# Patient Record
Sex: MALE | Race: WHITE
[De-identification: names, ages, dates, MRNs, and addresses within clinical notes are randomized per-mention and may not be internally consistent; named-entity substitution may affect disease eponyms.]

---

## 2017-05-31 ENCOUNTER — HOSPITAL ENCOUNTER (OUTPATIENT)
Dept: HOSPITAL 84 - D.CT | Age: 52
Discharge: HOME | End: 2017-05-31
Attending: FAMILY MEDICINE
Payer: SELF-PAY

## 2017-05-31 DIAGNOSIS — M79.604: ICD-10-CM

## 2017-05-31 DIAGNOSIS — R60.0: Primary | ICD-10-CM

## 2018-05-17 ENCOUNTER — HOSPITAL ENCOUNTER (INPATIENT)
Dept: HOSPITAL 84 - D.M2 | Age: 53
LOS: 3 days | Discharge: HOME | DRG: 603 | End: 2018-05-20
Attending: FAMILY MEDICINE | Admitting: FAMILY MEDICINE
Payer: COMMERCIAL

## 2018-05-17 VITALS — SYSTOLIC BLOOD PRESSURE: 137 MMHG | DIASTOLIC BLOOD PRESSURE: 85 MMHG

## 2018-05-17 VITALS — DIASTOLIC BLOOD PRESSURE: 95 MMHG | SYSTOLIC BLOOD PRESSURE: 123 MMHG

## 2018-05-17 VITALS
HEIGHT: 71 IN | HEIGHT: 71 IN | BODY MASS INDEX: 20.53 KG/M2 | BODY MASS INDEX: 20.53 KG/M2 | WEIGHT: 146.68 LBS | WEIGHT: 146.68 LBS

## 2018-05-17 DIAGNOSIS — Z87.891: ICD-10-CM

## 2018-05-17 DIAGNOSIS — Z95.1: ICD-10-CM

## 2018-05-17 DIAGNOSIS — L03.115: Primary | ICD-10-CM

## 2018-05-17 DIAGNOSIS — B35.3: ICD-10-CM

## 2018-05-17 DIAGNOSIS — I25.10: ICD-10-CM

## 2018-05-17 LAB
ALBUMIN SERPL-MCNC: 3.4 G/DL (ref 3.4–5)
ALP SERPL-CCNC: 107 U/L (ref 46–116)
ALT SERPL-CCNC: 63 U/L (ref 10–68)
ANION GAP SERPL CALC-SCNC: 13.5 MMOL/L (ref 8–16)
BASOPHILS NFR BLD AUTO: 0.4 % (ref 0–2)
BILIRUB SERPL-MCNC: 0.53 MG/DL (ref 0.2–1.3)
BUN SERPL-MCNC: 19 MG/DL (ref 7–18)
CALCIUM SERPL-MCNC: 8.4 MG/DL (ref 8.5–10.1)
CHLORIDE SERPL-SCNC: 106 MMOL/L (ref 98–107)
CO2 SERPL-SCNC: 25.2 MMOL/L (ref 21–32)
CREAT SERPL-MCNC: 1.9 MG/DL (ref 0.6–1.3)
EOSINOPHIL NFR BLD: 4.9 % (ref 0–7)
ERYTHROCYTE [DISTWIDTH] IN BLOOD BY AUTOMATED COUNT: 13.1 % (ref 11.5–14.5)
GLOBULIN SER-MCNC: 3.7 G/L
GLUCOSE SERPL-MCNC: 92 MG/DL (ref 74–106)
HCT VFR BLD CALC: 40.3 % (ref 42–54)
HGB BLD-MCNC: 14.3 G/DL (ref 13.5–17.5)
IMM GRANULOCYTES NFR BLD: 0.2 % (ref 0–5)
LYMPHOCYTES NFR BLD AUTO: 22.7 % (ref 15–50)
MCH RBC QN AUTO: 29.7 PG (ref 26–34)
MCHC RBC AUTO-ENTMCNC: 35.5 G/DL (ref 31–37)
MCV RBC: 83.6 FL (ref 80–100)
MONOCYTES NFR BLD: 14.9 % (ref 2–11)
NEUTROPHILS NFR BLD AUTO: 56.9 % (ref 40–80)
OSMOLALITY SERPL CALC.SUM OF ELEC: 282 MOSM/KG (ref 275–300)
PLATELET # BLD: 143 10X3/UL (ref 130–400)
PMV BLD AUTO: 9.4 FL (ref 7.4–10.4)
POTASSIUM SERPL-SCNC: 3.7 MMOL/L (ref 3.5–5.1)
PROT SERPL-MCNC: 7.1 G/DL (ref 6.4–8.2)
RBC # BLD AUTO: 4.82 10X6/UL (ref 4.2–6.1)
SODIUM SERPL-SCNC: 141 MMOL/L (ref 136–145)
WBC # BLD AUTO: 5.3 10X3/UL (ref 4.8–10.8)

## 2018-05-18 VITALS — SYSTOLIC BLOOD PRESSURE: 145 MMHG | DIASTOLIC BLOOD PRESSURE: 90 MMHG

## 2018-05-18 VITALS — DIASTOLIC BLOOD PRESSURE: 91 MMHG | SYSTOLIC BLOOD PRESSURE: 136 MMHG

## 2018-05-18 VITALS — DIASTOLIC BLOOD PRESSURE: 97 MMHG | SYSTOLIC BLOOD PRESSURE: 149 MMHG

## 2018-05-18 VITALS — DIASTOLIC BLOOD PRESSURE: 97 MMHG | SYSTOLIC BLOOD PRESSURE: 148 MMHG

## 2018-05-18 VITALS — SYSTOLIC BLOOD PRESSURE: 161 MMHG | DIASTOLIC BLOOD PRESSURE: 101 MMHG

## 2018-05-19 VITALS — DIASTOLIC BLOOD PRESSURE: 88 MMHG | SYSTOLIC BLOOD PRESSURE: 138 MMHG

## 2018-05-19 VITALS — SYSTOLIC BLOOD PRESSURE: 142 MMHG | DIASTOLIC BLOOD PRESSURE: 95 MMHG

## 2018-05-19 VITALS — SYSTOLIC BLOOD PRESSURE: 146 MMHG | DIASTOLIC BLOOD PRESSURE: 96 MMHG

## 2018-05-19 VITALS — DIASTOLIC BLOOD PRESSURE: 94 MMHG | SYSTOLIC BLOOD PRESSURE: 155 MMHG

## 2018-05-19 VITALS — DIASTOLIC BLOOD PRESSURE: 100 MMHG | SYSTOLIC BLOOD PRESSURE: 148 MMHG

## 2018-05-19 VITALS — SYSTOLIC BLOOD PRESSURE: 178 MMHG | DIASTOLIC BLOOD PRESSURE: 112 MMHG

## 2018-05-19 LAB
ANION GAP SERPL CALC-SCNC: 13.1 MMOL/L (ref 8–16)
BASOPHILS NFR BLD AUTO: 0.4 % (ref 0–2)
BUN SERPL-MCNC: 21 MG/DL (ref 7–18)
CALCIUM SERPL-MCNC: 8.3 MG/DL (ref 8.5–10.1)
CHLORIDE SERPL-SCNC: 107 MMOL/L (ref 98–107)
CO2 SERPL-SCNC: 25.7 MMOL/L (ref 21–32)
CREAT SERPL-MCNC: 1.9 MG/DL (ref 0.6–1.3)
EOSINOPHIL NFR BLD: 6.2 % (ref 0–7)
ERYTHROCYTE [DISTWIDTH] IN BLOOD BY AUTOMATED COUNT: 13 % (ref 11.5–14.5)
GLUCOSE SERPL-MCNC: 94 MG/DL (ref 74–106)
HCT VFR BLD CALC: 41 % (ref 42–54)
HGB BLD-MCNC: 14.3 G/DL (ref 13.5–17.5)
IMM GRANULOCYTES NFR BLD: 0 % (ref 0–5)
LYMPHOCYTES NFR BLD AUTO: 21.5 % (ref 15–50)
MCH RBC QN AUTO: 29.4 PG (ref 26–34)
MCHC RBC AUTO-ENTMCNC: 34.9 G/DL (ref 31–37)
MCV RBC: 84.2 FL (ref 80–100)
MONOCYTES NFR BLD: 10.4 % (ref 2–11)
NEUTROPHILS NFR BLD AUTO: 61.5 % (ref 40–80)
OSMOLALITY SERPL CALC.SUM OF ELEC: 285 MOSM/KG (ref 275–300)
PLATELET # BLD: 155 10X3/UL (ref 130–400)
PMV BLD AUTO: 9.8 FL (ref 7.4–10.4)
POTASSIUM SERPL-SCNC: 3.8 MMOL/L (ref 3.5–5.1)
RBC # BLD AUTO: 4.87 10X6/UL (ref 4.2–6.1)
SODIUM SERPL-SCNC: 142 MMOL/L (ref 136–145)
WBC # BLD AUTO: 4.5 10X3/UL (ref 4.8–10.8)

## 2018-05-20 VITALS — SYSTOLIC BLOOD PRESSURE: 144 MMHG | DIASTOLIC BLOOD PRESSURE: 93 MMHG

## 2018-05-20 VITALS — DIASTOLIC BLOOD PRESSURE: 93 MMHG | SYSTOLIC BLOOD PRESSURE: 150 MMHG

## 2018-05-20 VITALS — DIASTOLIC BLOOD PRESSURE: 99 MMHG | SYSTOLIC BLOOD PRESSURE: 154 MMHG

## 2018-05-20 VITALS — SYSTOLIC BLOOD PRESSURE: 161 MMHG | DIASTOLIC BLOOD PRESSURE: 99 MMHG

## 2018-05-20 LAB
ALBUMIN SERPL-MCNC: 3.6 G/DL (ref 3.4–5)
ALP SERPL-CCNC: 125 U/L (ref 46–116)
ALT SERPL-CCNC: 43 U/L (ref 10–68)
ANION GAP SERPL CALC-SCNC: 13.9 MMOL/L (ref 8–16)
BASOPHILS NFR BLD AUTO: 0.4 % (ref 0–2)
BILIRUB SERPL-MCNC: 0.42 MG/DL (ref 0.2–1.3)
BUN SERPL-MCNC: 21 MG/DL (ref 7–18)
CALCIUM SERPL-MCNC: 8.8 MG/DL (ref 8.5–10.1)
CHLORIDE SERPL-SCNC: 105 MMOL/L (ref 98–107)
CO2 SERPL-SCNC: 25.1 MMOL/L (ref 21–32)
CREAT SERPL-MCNC: 1.9 MG/DL (ref 0.6–1.3)
EOSINOPHIL NFR BLD: 5.6 % (ref 0–7)
ERYTHROCYTE [DISTWIDTH] IN BLOOD BY AUTOMATED COUNT: 13 % (ref 11.5–14.5)
GLOBULIN SER-MCNC: 3.7 G/L
GLUCOSE SERPL-MCNC: 102 MG/DL (ref 74–106)
HCT VFR BLD CALC: 43.5 % (ref 42–54)
HGB BLD-MCNC: 15.4 G/DL (ref 13.5–17.5)
IMM GRANULOCYTES NFR BLD: 0.4 % (ref 0–5)
LYMPHOCYTES NFR BLD AUTO: 25.5 % (ref 15–50)
MCH RBC QN AUTO: 29.7 PG (ref 26–34)
MCHC RBC AUTO-ENTMCNC: 35.4 G/DL (ref 31–37)
MCV RBC: 84 FL (ref 80–100)
MONOCYTES NFR BLD: 6.5 % (ref 2–11)
NEUTROPHILS NFR BLD AUTO: 61.6 % (ref 40–80)
OSMOLALITY SERPL CALC.SUM OF ELEC: 281 MOSM/KG (ref 275–300)
PLATELET # BLD: 183 10X3/UL (ref 130–400)
PMV BLD AUTO: 9.6 FL (ref 7.4–10.4)
POTASSIUM SERPL-SCNC: 4 MMOL/L (ref 3.5–5.1)
PROT SERPL-MCNC: 7.3 G/DL (ref 6.4–8.2)
RBC # BLD AUTO: 5.18 10X6/UL (ref 4.2–6.1)
SODIUM SERPL-SCNC: 140 MMOL/L (ref 136–145)
WBC # BLD AUTO: 4.6 10X3/UL (ref 4.8–10.8)

## 2018-06-26 ENCOUNTER — HOSPITAL ENCOUNTER (OUTPATIENT)
Dept: HOSPITAL 84 - OBSVTIME | Age: 53
LOS: 1 days | Discharge: HOME | End: 2018-06-27
Attending: INTERNAL MEDICINE
Payer: COMMERCIAL

## 2018-06-26 VITALS — DIASTOLIC BLOOD PRESSURE: 85 MMHG | SYSTOLIC BLOOD PRESSURE: 143 MMHG

## 2018-06-26 VITALS — SYSTOLIC BLOOD PRESSURE: 160 MMHG | DIASTOLIC BLOOD PRESSURE: 98 MMHG

## 2018-06-26 VITALS — SYSTOLIC BLOOD PRESSURE: 150 MMHG | DIASTOLIC BLOOD PRESSURE: 94 MMHG

## 2018-06-26 VITALS — SYSTOLIC BLOOD PRESSURE: 148 MMHG | DIASTOLIC BLOOD PRESSURE: 89 MMHG

## 2018-06-26 VITALS — DIASTOLIC BLOOD PRESSURE: 95 MMHG | SYSTOLIC BLOOD PRESSURE: 151 MMHG

## 2018-06-26 VITALS — HEIGHT: 71 IN | WEIGHT: 259.54 LBS | BODY MASS INDEX: 36.34 KG/M2

## 2018-06-26 DIAGNOSIS — I25.110: Primary | ICD-10-CM

## 2018-06-26 DIAGNOSIS — I10: ICD-10-CM

## 2018-06-26 DIAGNOSIS — I25.710: ICD-10-CM

## 2018-06-26 DIAGNOSIS — Z01.812: ICD-10-CM

## 2018-06-26 DIAGNOSIS — I25.2: ICD-10-CM

## 2018-06-26 DIAGNOSIS — E78.5: ICD-10-CM

## 2018-06-26 LAB
ALBUMIN SERPL-MCNC: 3.9 G/DL (ref 3.4–5)
ALP SERPL-CCNC: 151 U/L (ref 46–116)
ALT SERPL-CCNC: 47 U/L (ref 10–68)
ANION GAP SERPL CALC-SCNC: 11.7 MMOL/L (ref 8–16)
APTT BLD: 25.4 SECONDS (ref 22.8–39.4)
BASOPHILS NFR BLD AUTO: 0.4 % (ref 0–2)
BILIRUB SERPL-MCNC: 0.59 MG/DL (ref 0.2–1.3)
BUN SERPL-MCNC: 17 MG/DL (ref 7–18)
CALCIUM SERPL-MCNC: 8.4 MG/DL (ref 8.5–10.1)
CHLORIDE SERPL-SCNC: 106 MMOL/L (ref 98–107)
CK MB SERPL-MCNC: 2.7 U/L (ref 0–3.6)
CK SERPL-CCNC: 225 UL (ref 21–232)
CO2 SERPL-SCNC: 25.7 MMOL/L (ref 21–32)
CREAT SERPL-MCNC: 2 MG/DL (ref 0.6–1.3)
D DIMER PPP FEU-MCNC: < 0.27 UG/MLFEU (ref 0.2–0.54)
EOSINOPHIL NFR BLD: 6.2 % (ref 0–7)
ERYTHROCYTE [DISTWIDTH] IN BLOOD BY AUTOMATED COUNT: 13.4 % (ref 11.5–14.5)
GLOBULIN SER-MCNC: 3.1 G/L
GLUCOSE SERPL-MCNC: 87 MG/DL (ref 74–106)
HCT VFR BLD CALC: 40.5 % (ref 42–54)
HGB BLD-MCNC: 14.2 G/DL (ref 13.5–17.5)
IMM GRANULOCYTES NFR BLD: 0.2 % (ref 0–5)
INR PPP: 1.15 (ref 0.85–1.17)
LYMPHOCYTES NFR BLD AUTO: 25.6 % (ref 15–50)
MAGNESIUM SERPL-MCNC: 2.2 MG/DL (ref 1.8–2.4)
MCH RBC QN AUTO: 29.6 PG (ref 26–34)
MCHC RBC AUTO-ENTMCNC: 35.1 G/DL (ref 31–37)
MCV RBC: 84.4 FL (ref 80–100)
MONOCYTES NFR BLD: 9.4 % (ref 2–11)
NEUTROPHILS NFR BLD AUTO: 58.2 % (ref 40–80)
OSMOLALITY SERPL CALC.SUM OF ELEC: 279 MOSM/KG (ref 275–300)
PLATELET # BLD: 133 10X3/UL (ref 130–400)
PMV BLD AUTO: 9.8 FL (ref 7.4–10.4)
POTASSIUM SERPL-SCNC: 3.4 MMOL/L (ref 3.5–5.1)
PROT SERPL-MCNC: 7 G/DL (ref 6.4–8.2)
PROTHROMBIN TIME: 14.2 SECONDS (ref 11.6–15)
RBC # BLD AUTO: 4.8 10X6/UL (ref 4.2–6.1)
SODIUM SERPL-SCNC: 140 MMOL/L (ref 136–145)
TROPONIN I SERPL-MCNC: 0.05 NG/ML (ref 0–0.06)
WBC # BLD AUTO: 4.8 10X3/UL (ref 4.8–10.8)

## 2018-06-27 VITALS — DIASTOLIC BLOOD PRESSURE: 80 MMHG | SYSTOLIC BLOOD PRESSURE: 136 MMHG

## 2018-06-27 VITALS — DIASTOLIC BLOOD PRESSURE: 99 MMHG | SYSTOLIC BLOOD PRESSURE: 138 MMHG

## 2018-06-27 VITALS — DIASTOLIC BLOOD PRESSURE: 98 MMHG | SYSTOLIC BLOOD PRESSURE: 153 MMHG

## 2018-06-27 VITALS — DIASTOLIC BLOOD PRESSURE: 101 MMHG | SYSTOLIC BLOOD PRESSURE: 158 MMHG

## 2018-06-27 VITALS — DIASTOLIC BLOOD PRESSURE: 89 MMHG | SYSTOLIC BLOOD PRESSURE: 144 MMHG

## 2018-06-27 VITALS — DIASTOLIC BLOOD PRESSURE: 96 MMHG | SYSTOLIC BLOOD PRESSURE: 161 MMHG

## 2018-07-01 ENCOUNTER — HOSPITAL ENCOUNTER (OUTPATIENT)
Dept: HOSPITAL 84 - OBSVTIME | Age: 53
Discharge: HOME | End: 2018-07-01
Attending: INTERNAL MEDICINE
Payer: COMMERCIAL

## 2018-07-01 VITALS — SYSTOLIC BLOOD PRESSURE: 120 MMHG | DIASTOLIC BLOOD PRESSURE: 86 MMHG

## 2018-07-01 VITALS — SYSTOLIC BLOOD PRESSURE: 128 MMHG | DIASTOLIC BLOOD PRESSURE: 71 MMHG

## 2018-07-01 VITALS — DIASTOLIC BLOOD PRESSURE: 100 MMHG | SYSTOLIC BLOOD PRESSURE: 144 MMHG

## 2018-07-01 VITALS — WEIGHT: 246.92 LBS | BODY MASS INDEX: 34.57 KG/M2 | BODY MASS INDEX: 34.57 KG/M2 | HEIGHT: 71 IN

## 2018-07-01 VITALS — DIASTOLIC BLOOD PRESSURE: 109 MMHG | SYSTOLIC BLOOD PRESSURE: 163 MMHG

## 2018-07-01 VITALS — SYSTOLIC BLOOD PRESSURE: 142 MMHG | DIASTOLIC BLOOD PRESSURE: 88 MMHG

## 2018-07-01 VITALS — SYSTOLIC BLOOD PRESSURE: 131 MMHG | DIASTOLIC BLOOD PRESSURE: 87 MMHG

## 2018-07-01 DIAGNOSIS — Z95.5: ICD-10-CM

## 2018-07-01 DIAGNOSIS — Z79.02: ICD-10-CM

## 2018-07-01 DIAGNOSIS — Z79.82: ICD-10-CM

## 2018-07-01 DIAGNOSIS — Z01.812: ICD-10-CM

## 2018-07-01 DIAGNOSIS — I25.110: Primary | ICD-10-CM

## 2018-07-01 DIAGNOSIS — I10: ICD-10-CM

## 2018-07-01 DIAGNOSIS — Z95.1: ICD-10-CM

## 2018-07-01 DIAGNOSIS — E78.5: ICD-10-CM

## 2018-07-01 LAB
ALBUMIN SERPL-MCNC: 3.9 G/DL (ref 3.4–5)
ALP SERPL-CCNC: 135 U/L (ref 46–116)
ALT SERPL-CCNC: 35 U/L (ref 10–68)
ANION GAP SERPL CALC-SCNC: 11.4 MMOL/L (ref 8–16)
APTT BLD: 26.2 SECONDS (ref 22.8–39.4)
BASOPHILS NFR BLD AUTO: 0.6 % (ref 0–2)
BILIRUB SERPL-MCNC: 0.9 MG/DL (ref 0.2–1.3)
BUN SERPL-MCNC: 14 MG/DL (ref 7–18)
CALCIUM SERPL-MCNC: 9.2 MG/DL (ref 8.5–10.1)
CHLORIDE SERPL-SCNC: 105 MMOL/L (ref 98–107)
CK MB SERPL-MCNC: 1.3 U/L (ref 0–3.6)
CK SERPL-CCNC: 109 UL (ref 21–232)
CO2 SERPL-SCNC: 27.4 MMOL/L (ref 21–32)
CREAT SERPL-MCNC: 1.9 MG/DL (ref 0.6–1.3)
EOSINOPHIL NFR BLD: 6.6 % (ref 0–7)
ERYTHROCYTE [DISTWIDTH] IN BLOOD BY AUTOMATED COUNT: 13.2 % (ref 11.5–14.5)
GLOBULIN SER-MCNC: 3.4 G/L
GLUCOSE SERPL-MCNC: 98 MG/DL (ref 74–106)
HCT VFR BLD CALC: 43.1 % (ref 42–54)
HGB BLD-MCNC: 15.1 G/DL (ref 13.5–17.5)
IMM GRANULOCYTES NFR BLD: 0.2 % (ref 0–5)
INR PPP: 1.17 (ref 0.85–1.17)
LYMPHOCYTES NFR BLD AUTO: 30.1 % (ref 15–50)
MCH RBC QN AUTO: 29.3 PG (ref 26–34)
MCHC RBC AUTO-ENTMCNC: 35 G/DL (ref 31–37)
MCV RBC: 83.7 FL (ref 80–100)
MONOCYTES NFR BLD: 9.6 % (ref 2–11)
NEUTROPHILS NFR BLD AUTO: 52.9 % (ref 40–80)
NT-PROBNP SERPL-MCNC: 213 PG/ML (ref 0–125)
OSMOLALITY SERPL CALC.SUM OF ELEC: 279 MOSM/KG (ref 275–300)
PLATELET # BLD: 144 10X3/UL (ref 130–400)
PMV BLD AUTO: 9.7 FL (ref 7.4–10.4)
POTASSIUM SERPL-SCNC: 3.8 MMOL/L (ref 3.5–5.1)
PROT SERPL-MCNC: 7.3 G/DL (ref 6.4–8.2)
PROTHROMBIN TIME: 14.5 SECONDS (ref 11.6–15)
RBC # BLD AUTO: 5.15 10X6/UL (ref 4.2–6.1)
SODIUM SERPL-SCNC: 140 MMOL/L (ref 136–145)
TROPONIN I SERPL-MCNC: 0.03 NG/ML (ref 0–0.06)
WBC # BLD AUTO: 5.1 10X3/UL (ref 4.8–10.8)

## 2018-12-15 ENCOUNTER — HOSPITAL ENCOUNTER (OUTPATIENT)
Dept: HOSPITAL 84 - D.ER | Age: 53
Setting detail: OBSERVATION
Discharge: HOME | End: 2018-12-15
Attending: INTERNAL MEDICINE | Admitting: INTERNAL MEDICINE
Payer: MEDICAID

## 2018-12-15 VITALS — SYSTOLIC BLOOD PRESSURE: 110 MMHG | DIASTOLIC BLOOD PRESSURE: 92 MMHG

## 2018-12-15 VITALS — SYSTOLIC BLOOD PRESSURE: 120 MMHG | DIASTOLIC BLOOD PRESSURE: 79 MMHG

## 2018-12-15 VITALS
BODY MASS INDEX: 35 KG/M2 | BODY MASS INDEX: 35 KG/M2 | HEIGHT: 71 IN | BODY MASS INDEX: 35 KG/M2 | WEIGHT: 250 LBS | HEIGHT: 71 IN | WEIGHT: 250 LBS

## 2018-12-15 VITALS — SYSTOLIC BLOOD PRESSURE: 115 MMHG | DIASTOLIC BLOOD PRESSURE: 82 MMHG

## 2018-12-15 VITALS — DIASTOLIC BLOOD PRESSURE: 82 MMHG | SYSTOLIC BLOOD PRESSURE: 115 MMHG

## 2018-12-15 VITALS — SYSTOLIC BLOOD PRESSURE: 160 MMHG | DIASTOLIC BLOOD PRESSURE: 78 MMHG

## 2018-12-15 DIAGNOSIS — Z95.1: ICD-10-CM

## 2018-12-15 DIAGNOSIS — K21.9: ICD-10-CM

## 2018-12-15 DIAGNOSIS — I10: ICD-10-CM

## 2018-12-15 DIAGNOSIS — E03.9: ICD-10-CM

## 2018-12-15 DIAGNOSIS — E78.5: ICD-10-CM

## 2018-12-15 DIAGNOSIS — I25.110: Primary | ICD-10-CM

## 2018-12-15 LAB
ALBUMIN SERPL-MCNC: 4 G/DL (ref 3.4–5)
ALP SERPL-CCNC: 154 U/L (ref 46–116)
ALT SERPL-CCNC: 155 U/L (ref 10–68)
ANION GAP SERPL CALC-SCNC: 15.8 MMOL/L (ref 8–16)
BASOPHILS NFR BLD AUTO: 0.3 % (ref 0–2)
BILIRUB SERPL-MCNC: 0.36 MG/DL (ref 0.2–1.3)
BUN SERPL-MCNC: 20 MG/DL (ref 7–18)
CALCIUM SERPL-MCNC: 8.5 MG/DL (ref 8.5–10.1)
CHLORIDE SERPL-SCNC: 104 MMOL/L (ref 98–107)
CK MB SERPL-MCNC: 4.3 U/L (ref 0–3.6)
CK SERPL-CCNC: 170 UL (ref 21–232)
CO2 SERPL-SCNC: 25 MMOL/L (ref 21–32)
CREAT SERPL-MCNC: 1.6 MG/DL (ref 0.6–1.3)
EOSINOPHIL NFR BLD: 7.6 % (ref 0–7)
ERYTHROCYTE [DISTWIDTH] IN BLOOD BY AUTOMATED COUNT: 13.1 % (ref 11.5–14.5)
GLOBULIN SER-MCNC: 3.4 G/L
GLUCOSE SERPL-MCNC: 98 MG/DL (ref 74–106)
HCT VFR BLD CALC: 43.1 % (ref 42–54)
HGB BLD-MCNC: 15.2 G/DL (ref 13.5–17.5)
IMM GRANULOCYTES NFR BLD: 0.2 % (ref 0–5)
INR PPP: 1.07 (ref 0.85–1.17)
LYMPHOCYTES NFR BLD AUTO: 31.4 % (ref 15–50)
MAGNESIUM SERPL-MCNC: 2.1 MG/DL (ref 1.8–2.4)
MCH RBC QN AUTO: 30.8 PG (ref 26–34)
MCHC RBC AUTO-ENTMCNC: 35.3 G/DL (ref 31–37)
MCV RBC: 87.2 FL (ref 80–100)
MONOCYTES NFR BLD: 10.5 % (ref 2–11)
NEUTROPHILS NFR BLD AUTO: 50 % (ref 40–80)
OSMOLALITY SERPL CALC.SUM OF ELEC: 283 MOSM/KG (ref 275–300)
PLATELET # BLD: 121 10X3/UL (ref 130–400)
PMV BLD AUTO: 10 FL (ref 7.4–10.4)
POTASSIUM SERPL-SCNC: 3.8 MMOL/L (ref 3.5–5.1)
PROT SERPL-MCNC: 7.4 G/DL (ref 6.4–8.2)
PROTHROMBIN TIME: 13.4 SECONDS (ref 11.6–15)
RBC # BLD AUTO: 4.94 10X6/UL (ref 4.2–6.1)
SODIUM SERPL-SCNC: 141 MMOL/L (ref 136–145)
TROPONIN I SERPL-MCNC: 0.82 NG/ML (ref 0–0.06)
WBC # BLD AUTO: 6.6 10X3/UL (ref 4.8–10.8)

## 2018-12-29 ENCOUNTER — HOSPITAL ENCOUNTER (OUTPATIENT)
Dept: HOSPITAL 84 - D.ER | Age: 53
Setting detail: OBSERVATION
Discharge: HOME | End: 2018-12-29
Attending: FAMILY MEDICINE | Admitting: FAMILY MEDICINE
Payer: MEDICAID

## 2018-12-29 VITALS — SYSTOLIC BLOOD PRESSURE: 160 MMHG | DIASTOLIC BLOOD PRESSURE: 92 MMHG

## 2018-12-29 VITALS — SYSTOLIC BLOOD PRESSURE: 128 MMHG | DIASTOLIC BLOOD PRESSURE: 83 MMHG

## 2018-12-29 VITALS — DIASTOLIC BLOOD PRESSURE: 84 MMHG | SYSTOLIC BLOOD PRESSURE: 141 MMHG

## 2018-12-29 VITALS — WEIGHT: 265.56 LBS | BODY MASS INDEX: 37.18 KG/M2 | BODY MASS INDEX: 37.18 KG/M2 | HEIGHT: 71 IN

## 2018-12-29 VITALS — DIASTOLIC BLOOD PRESSURE: 88 MMHG | SYSTOLIC BLOOD PRESSURE: 135 MMHG

## 2018-12-29 VITALS — DIASTOLIC BLOOD PRESSURE: 83 MMHG | SYSTOLIC BLOOD PRESSURE: 140 MMHG

## 2018-12-29 VITALS — SYSTOLIC BLOOD PRESSURE: 139 MMHG | DIASTOLIC BLOOD PRESSURE: 85 MMHG

## 2018-12-29 VITALS — DIASTOLIC BLOOD PRESSURE: 81 MMHG | SYSTOLIC BLOOD PRESSURE: 126 MMHG

## 2018-12-29 DIAGNOSIS — E03.9: ICD-10-CM

## 2018-12-29 DIAGNOSIS — I10: ICD-10-CM

## 2018-12-29 DIAGNOSIS — Z95.1: ICD-10-CM

## 2018-12-29 DIAGNOSIS — I25.118: Primary | ICD-10-CM

## 2018-12-29 DIAGNOSIS — E78.5: ICD-10-CM

## 2018-12-29 LAB
ALBUMIN SERPL-MCNC: 3.7 G/DL (ref 3.4–5)
ALP SERPL-CCNC: 151 U/L (ref 46–116)
ALT SERPL-CCNC: 46 U/L (ref 10–68)
ANION GAP SERPL CALC-SCNC: 15.3 MMOL/L (ref 8–16)
APTT BLD: 25.1 SECONDS (ref 22.8–39.4)
BILIRUB SERPL-MCNC: 0.29 MG/DL (ref 0.2–1.3)
BUN SERPL-MCNC: 20 MG/DL (ref 7–18)
CALCIUM SERPL-MCNC: 8.5 MG/DL (ref 8.5–10.1)
CHLORIDE SERPL-SCNC: 108 MMOL/L (ref 98–107)
CK MB SERPL-MCNC: 1.7 U/L (ref 0–3.6)
CK MB SERPL-MCNC: 2.1 U/L (ref 0–3.6)
CK MB SERPL-MCNC: 2.7 U/L (ref 0–3.6)
CK SERPL-CCNC: 113 UL (ref 21–232)
CK SERPL-CCNC: 86 UL (ref 21–232)
CK SERPL-CCNC: 91 UL (ref 21–232)
CO2 SERPL-SCNC: 24.6 MMOL/L (ref 21–32)
CREAT SERPL-MCNC: 1.8 MG/DL (ref 0.6–1.3)
ERYTHROCYTE [DISTWIDTH] IN BLOOD BY AUTOMATED COUNT: 13.2 % (ref 11.5–14.5)
GLOBULIN SER-MCNC: 3.3 G/L
GLUCOSE SERPL-MCNC: 113 MG/DL (ref 74–106)
HCT VFR BLD CALC: 41.1 % (ref 42–54)
HGB BLD-MCNC: 14.6 G/DL (ref 13.5–17.5)
INR PPP: 1.17 (ref 0.85–1.17)
LYMPHOCYTES NFR BLD AUTO: 33.1 % (ref 15–50)
MAGNESIUM SERPL-MCNC: 1.9 MG/DL (ref 1.8–2.4)
MCH RBC QN AUTO: 30.8 PG (ref 26–34)
MCHC RBC AUTO-ENTMCNC: 35.5 G/DL (ref 31–37)
MCV RBC: 86.7 FL (ref 80–100)
NEUTROPHILS NFR BLD AUTO: 51.9 % (ref 40–80)
OSMOLALITY SERPL CALC.SUM OF ELEC: 290 MOSM/KG (ref 275–300)
PLATELET # BLD: 125 10X3/UL (ref 130–400)
PMV BLD AUTO: 9.1 FL (ref 7.4–10.4)
POTASSIUM SERPL-SCNC: 3.9 MMOL/L (ref 3.5–5.1)
PROT SERPL-MCNC: 7 G/DL (ref 6.4–8.2)
PROTHROMBIN TIME: 14.3 SECONDS (ref 11.6–15)
RBC # BLD AUTO: 4.74 10X6/UL (ref 4.2–6.1)
SODIUM SERPL-SCNC: 144 MMOL/L (ref 136–145)
TROPONIN I SERPL-MCNC: 0.09 NG/ML (ref 0–0.06)
TROPONIN I SERPL-MCNC: 0.1 NG/ML (ref 0–0.06)
TROPONIN I SERPL-MCNC: 0.1 NG/ML (ref 0–0.06)
WBC # BLD AUTO: 5.2 10X3/UL (ref 4.8–10.8)

## 2018-12-31 NOTE — MORECARE
CASE MANAGEMENT DISCHARGE SUMMARY
 
 
PATIENT: SUSAN LÓPEZ                    UNIT: [id number]
ACCOUNT#: [id number]                       ADM DATE: 18
AGE: 53     : 65  SEX: M            ROOM/BED: D.2101    
AUTHOR: BRENDA FRASER                             PHYSICIAN:                               
 
REFERRING PHYSICIAN: ISABEL OLIVIA MD           
DATE OF SERVICE: 18
Discharge Plan
 
 
Patient Name: SUSAN LÓPEZ
Facility: [facility]:[place]
Encounter #: [id number]
Medical Record #: [id number]
: 1965
Planned Disposition: Home
Anticipated Discharge Date: 18
 
Discharge Date: 2018
Expected LOS: 1
Initial Reviewer: [id number]
Initial Review Date: 2018
Generated: 18  10:03 am 
  
 
 
 
 
 
 
 
Patient Name: SUSAN LÓPEZ
 
Encounter #: [id number]
Page [id number]
 
 
 
 
 
Electronically Signed by BRENDA FRASER on 18 at 0903
 
 
 
 
 
 
**All edits/amendments must be made on the electronic document**
 
DICTATION DATE: 18     : CONRADO  18     
RPT#: [id number]                                DC DATE:18
                                               STATUS: DIS IN  
[facility]
[address] [place] [address]
***END OF REPORT***

## 2018-12-31 NOTE — CN
PATIENT NAME:SUSAN LÓPEZ                              MEDICAL RECORD: [id number]
: 65                                              LOCATION:D. D.2101
ADMIT DATE: 18                                       ACCOUNT: [id number]
CONSULTING PHYSICIAN:    SHYAM MARTINEZ MD             
                                               
REFERRING PHYSICIAN:     ISABEL OLIVIA MD           
 
 
DATE OF CONSULTATION:  2018
 
DIAGNOSES:
1.  Angina, chronic.
2.  Coronary artery disease.
3.  Status post coronary bypass graft surgery.
4.  Hypertension.
5.  Hyperlipidemia.
 
HISTORY:  Mr. López presents with continued angina.  Troponin is elevated.  He
was just seen by Dr. Zamorano on  with similar.  He had a cardiac
catheterization revealing patency of the vein graft to the circumflex, diagonal,
LIMA to the LAD, and closure of the vein graft to the RCA.  He had diffuse
distal disease and small vessel disease.  This is the etiology of his chronic
angina as well as the closure of the RCA territory.  This was only a medical
management situation.  There was no role for cardiac intervention.  He was
placed on Ranexa as well as on lisinopril, metoprolol, and Lipitor.  He has
continued to have anginal symptomatology in a debilitating way.  He is unable to
really do any significant exertion without having significant anginal
symptomatology.  In the past, he was on nitro patch.  He only took this p.r.n.
and it was only 0.2 mg patch.  His systolic blood pressure is in the 130s and
heart rate is in the 70s.
 
PHYSICAL EXAMINATION:
GENERAL APPEARANCE:  Well-nourished, well-developed, appears stated age.  Level
of distress, comfortable.
PSYCHIATRIC:  Mental status, alert, normal affect.  Orientation, oriented to
time, place and person.
EYES:  Lids and conjunctiva, noninjected.  No discharge, no pallor.
ENT:  Lips, teeth, gums, normal dentition.  Oropharynx, no cyanosis, no pallor.
NECK:  Carotid arteries, bilateral normal upstroke, no bruits, no thrills.
JUGULAR VEINS:  No jugular venous pressure or distention.
CERVICAL LYMPH NODES:  Nontender, nonenlarged.
THYROID:  Not enlarged.  Nontender.  No nodules.
LUNGS:  Respiratory effort, unlabored.
CHEST:  Normal curvature.  No thoracic deformity.  No chest wall tenderness. 
Percussion, resonant.  Auscultation, clear.  No wheezes, no rales, no rhonchi.
CARDIOVASCULAR:  Precordial exam, nondisplaced.  No heaves or pericardial
thrills.  Rate and rhythm, regular.  Heart sounds, normal S1, normal S2.  No S3,
no gallop, no rub.  Systolic murmur, not heard.  Diastolic murmur, not heard.
EXTREMITIES:  No cyanosis, no edema.  Peripheral pulses, full and equal in all
extremities, except as noted.  No bruits appreciated.
ABDOMEN:  Soft, nondistended.  Normal aorta.  No bruit.  Nontender.  No masses. 
Liver, nontender, no hepatomegaly.  Spleen, nontender, no splenomegaly.
MUSCULOSKELETAL:  No joint tenderness.  No joint swelling.  No erythema.
NEUROLOGICAL:  Normal gait, normal strength, normal tone.
SKIN:  Warm and dry.
 
OVERALL IMPRESSION:  Continued anginal symptomatology.  It is debilitating.  We
will add the nitro patch back at 0.6.  Continue the Ranexa.  We will see him
 
 
 
CONSULT REPORT                                 [id number]    SUSAN LÓPEZ            
 
 
this week on Thursday for an appointment to see if this has made a difference
and continue medical adjustment.  He is only on 500 mg of Ranexa.  We could go
up to 1000 mg twice a day on the Ranexa for the angina.  This will be determined
with what angina he has after the addition of the nitro patch.
 
TRANSINT:[id number] Voice Confirmation ID: [id number] DOCUMENT ID: [id number]
                                           
                                           SHYAM MARTINEZ MD             
 
 
 
Electronically Signed by SHYAM MARTINEZ on 18 at 1436
 
 
 
 
 
 
 
 
 
 
 
 
 
 
 
 
 
 
 
 
 
 
 
 
 
 
 
 
 
 
 
 
 
 
 
CC:                                                             [id number]
DICTATION DATE: 18 1048     :     18 1836      DIS IN  
                                                                      18
[facility]                                          
[address] [id number]

## 2018-12-31 NOTE — MORECARE
CASE MANAGEMENT DISCHARGE SUMMARY
 
 
PATIENT: SUSAN LÓPEZ                    UNIT: [id number]
ACCOUNT#: [id number]                       ADM DATE: 18
AGE: 53     : 65  SEX: M            ROOM/BED: D.2101    
AUTHOR: BRENDA FRASER                             PHYSICIAN:                               
 
REFERRING PHYSICIAN: ISABEL OLIVIA MD           
DATE OF SERVICE: 18
Discharge Plan
 
 
Patient Name: SUSAN LÓPEZ
Facility: [facility]:[place]
Encounter #: [id number]
Medical Record #: [id number]
: 1965
Planned Disposition: Home
Anticipated Discharge Date: 18
 
Discharge Date: 2018
Expected LOS: 1
Initial Reviewer: [email or web address]
Initial Review Date: 2018
Generated: 18   9:56 am 
  
 
 
 
 
 
 
 
Patient Name: SUSAN LÓPEZ
 
Encounter #: [id number]
Page [id number]
 
 
 
 
 
Electronically Signed by BRENDA FRASER on 18 at 0856
 
 
 
 
 
 
**All edits/amendments must be made on the electronic document**
 
DICTATION DATE: 1856     : CONRADO  18 0856     
RPT#: [id number]                                DC DATE:18
                                               STATUS: DIS IN  
[facility]
[address] [place] [address]
***END OF REPORT***

## 2019-03-10 ENCOUNTER — HOSPITAL ENCOUNTER (OUTPATIENT)
Dept: HOSPITAL 84 - D.ER | Age: 54
Setting detail: OBSERVATION
LOS: 1 days | Discharge: HOME | End: 2019-03-11
Attending: INTERNAL MEDICINE | Admitting: INTERNAL MEDICINE
Payer: MEDICAID

## 2019-03-10 VITALS — DIASTOLIC BLOOD PRESSURE: 86 MMHG | SYSTOLIC BLOOD PRESSURE: 134 MMHG

## 2019-03-10 VITALS
WEIGHT: 246.52 LBS | HEIGHT: 71 IN | BODY MASS INDEX: 34.51 KG/M2 | HEIGHT: 71 IN | BODY MASS INDEX: 34.51 KG/M2 | WEIGHT: 246.52 LBS

## 2019-03-10 VITALS — DIASTOLIC BLOOD PRESSURE: 88 MMHG | SYSTOLIC BLOOD PRESSURE: 145 MMHG

## 2019-03-10 VITALS — DIASTOLIC BLOOD PRESSURE: 93 MMHG | SYSTOLIC BLOOD PRESSURE: 143 MMHG

## 2019-03-10 VITALS — SYSTOLIC BLOOD PRESSURE: 163 MMHG | DIASTOLIC BLOOD PRESSURE: 99 MMHG

## 2019-03-10 DIAGNOSIS — I10: ICD-10-CM

## 2019-03-10 DIAGNOSIS — E78.5: ICD-10-CM

## 2019-03-10 DIAGNOSIS — I25.110: Primary | ICD-10-CM

## 2019-03-10 DIAGNOSIS — Z87.891: ICD-10-CM

## 2019-03-10 DIAGNOSIS — K21.9: ICD-10-CM

## 2019-03-10 LAB
ALBUMIN SERPL-MCNC: 3.8 G/DL (ref 3.4–5)
ALP SERPL-CCNC: 119 U/L (ref 46–116)
ALT SERPL-CCNC: 43 U/L (ref 10–68)
ANION GAP SERPL CALC-SCNC: 14.1 MMOL/L (ref 8–16)
APTT BLD: 25.4 SECONDS (ref 22.8–39.4)
BASOPHILS NFR BLD AUTO: 0.3 % (ref 0–2)
BILIRUB SERPL-MCNC: 0.58 MG/DL (ref 0.2–1.3)
BUN SERPL-MCNC: 14 MG/DL (ref 7–18)
CALCIUM SERPL-MCNC: 8.2 MG/DL (ref 8.5–10.1)
CHLORIDE SERPL-SCNC: 107 MMOL/L (ref 98–107)
CK MB SERPL-MCNC: 2.3 U/L (ref 0–3.6)
CK SERPL-CCNC: 143 UL (ref 21–232)
CO2 SERPL-SCNC: 25.4 MMOL/L (ref 21–32)
CREAT SERPL-MCNC: 1.7 MG/DL (ref 0.6–1.3)
EOSINOPHIL NFR BLD: 4.6 % (ref 0–7)
ERYTHROCYTE [DISTWIDTH] IN BLOOD BY AUTOMATED COUNT: 13.9 % (ref 11.5–14.5)
GLOBULIN SER-MCNC: 3.1 G/L
GLUCOSE SERPL-MCNC: 110 MG/DL (ref 74–106)
HCT VFR BLD CALC: 43.4 % (ref 42–54)
HGB BLD-MCNC: 15.4 G/DL (ref 13.5–17.5)
IMM GRANULOCYTES NFR BLD: 0.2 % (ref 0–5)
INR PPP: 1.2 (ref 0.85–1.17)
LYMPHOCYTES NFR BLD AUTO: 24.3 % (ref 15–50)
MAGNESIUM SERPL-MCNC: 2.1 MG/DL (ref 1.8–2.4)
MCH RBC QN AUTO: 31.3 PG (ref 26–34)
MCHC RBC AUTO-ENTMCNC: 35.5 G/DL (ref 31–37)
MCV RBC: 88.2 FL (ref 80–100)
MONOCYTES NFR BLD: 8.7 % (ref 2–11)
NEUTROPHILS NFR BLD AUTO: 61.9 % (ref 40–80)
OSMOLALITY SERPL CALC.SUM OF ELEC: 286 MOSM/KG (ref 275–300)
PLATELET # BLD: 135 10X3/UL (ref 130–400)
PMV BLD AUTO: 9.7 FL (ref 7.4–10.4)
POTASSIUM SERPL-SCNC: 3.5 MMOL/L (ref 3.5–5.1)
PROT SERPL-MCNC: 6.9 G/DL (ref 6.4–8.2)
PROTHROMBIN TIME: 14.7 SECONDS (ref 11.6–15)
RBC # BLD AUTO: 4.92 10X6/UL (ref 4.2–6.1)
SODIUM SERPL-SCNC: 143 MMOL/L (ref 136–145)
TROPONIN I SERPL-MCNC: 0.03 NG/ML (ref 0–0.06)
WBC # BLD AUTO: 6.1 10X3/UL (ref 4.8–10.8)

## 2019-03-10 NOTE — HEMODYNAMI
PATIENT:SUSAN LÓPEZ                                 MEDICAL RECORD: [id number]
: 65                                            LOCATION:[facility]     [id number]                                       ADMISSION DATE: 19
 
 
 Generatedon:3/11/274417:13
Patient name: SUSAN LÓPEZ Patient #: [id number] Visit #: [id number] SSN: :
 1965 Date of
study: 3/11/2019
Page: Of
Hemodynamic Procedure Report
****************************
Patient Data
Patient Demographics
Procedure consent was obtained
First Name: SUSAN           Gender: Male
Last Name: RENE           : 1965
Middle Initial: SAMMIE         Age: 53 year(s)
Patient #: [id number]       Race: Unknown
Visit #: [id number]
Accession #:
[id number]
Additional ID: [id number]
Contact details
Address: [address] [place] [address]    Phone: [phone]
State: [place]
City: [place]
Zip code: [address]
Past Medical History
Allergies: No known allergies
Admission
Admission Data
Admission Date: 3/11/2019   Admission Time: 1:02
Admit Source: Other
Room #: D.2123
Height (in.): 71            BSA: 2.28 (m2)
Height (cm.): 180.34        BMI: 33.61 (kg/m2)
Weight (lbs.): 241
Weight (kg.): 109.32
Lab Results
Lab Result Date: 3/11/2019  Lab Result Time: 0:00
Biochemistry
Name         Units    Result                Min      Max
BUN          mg/dl    14       --(--*-)--   7        18
Creatinine   mg/dl    1.7      --(----)-*   0.6      1.3
CBC
Name         Units    Result                Min      Max
Hemoglobin   g/dl     15.4     --(-*--)--   13.5     17.5
Procedure
Procedure Types
Cath Procedure
Diagnostic Procedure
LHC
LHC w/Coronaries w/Grafts
Sedation Charges
Moderate Sedation up to 15 minutes
PCI Procedure
AMI/SVG/ PTCA or Stent
SVG-BMS/ROCCO Initial
 
Procedure Description
Procedure Date
Procedure Date: 3/11/2019
Procedure Start Time: 16:53
Procedure End Time: 17:13
Procedure Staff
Name                            Function
Tony De Dios MD                Performing Physician
Kirsten Shay RT             Monitor
Eloy Draper RT                  Scrub
Cooper Main RN                  Nurse
Sammie Whittaker RT                     Circulator
Procedure Data
Cath Procedure
Fluoroscopy
Diagnostic fluoroscopy      Total fluoroscopy Time: 8.1
time: 8.1 min               min
Diagnostic fluoroscopy      Total fluoroscopy dose:
dose: 1098 mGy              1098 mGy
Contrast Material
Contrast Material Type                       Amount (ml)
Isovue 300                                   164
Entry Location
Entry     Primary  Successful  Side  Size  Upsize Upsize Entry    Closure Succes
sful  Closure
Location                             (Fr)  1 (Fr) 2 (Fr) Remarks  Device        
      Remarks
Femoral                        Right 5 Fr  6 Fr                   Exoseal
artery                                     Short
Estimated blood loss: 10 ml
Diagnostic catheters
Device Type               Used For           End Catheter
Placement
MULTIPACK Pigtail 5 Fr    LV Angiography
catheter
MULTIPACK JL 4.0 5Fr      Left Coronary
catheter                  Angiography
MULTIPACK 3DRC 5Fr        Internal mammary
catheter                  arteriography
MULTIPACK 3DRC 5Fr        SVG Angiography
catheter
DIAGNOSTIC AR2 MOD 5 Fr   SVG Angiography
catheter ([id number])
Procedure Complications
No complications
Procedure Medications
Medication           Administration Route Dosage
Oxygen               etCO2 Nasal cannula  2 l/min
Lidocaine 2%         added to field       20
Heparin Flush Bag    added to field       2 bags
(1000units/500ml NS)
0.9% NaCl            I.V.                 100 ml/hr
Versed               I.V.                 2 mg
Fentanyl             I.V.                 100 mcg
Versed               I.V.                 1 mg
Fentanyl             I.V.                 50 mcg
Heparin Bolus        I.V.                 4000 units
Hemodynamics
Rest
BSA: 2.28 (m2) O2 Consumption: Estimated: 310.08 (ml/min) O2 Consumption indexed
 
: Estimated:136
(ml/min/m)
Pre Cath      Intra         NCS           Post Cath
Vital Signs
Time     Heart  Resp   SPO2 etCO2   NIBP (mmHg)  Rhythm  Pain    Sedation
Rate   (ipm)  (%)  (mmHg)                       Status  Level
(bpm)
16:45:07 58     17     98   0       135/100(118) NSR     0 (11)  10(A)
, No
pain
16:50:35 59     16     99   9       150/98(122)  NSR     0 (11)  10(A)
, No
pain
16:55:43 59     17     99   29.4    150/97(116)  NSR     0 (11)  9(A)
, No
pain
17:02:43 62     17     99   21.1    137/88(110)  NSR     0 (11)  9(A)
, No
pain
17:07:55 65     18     99   29.4    141/94(115)  NSR     0 (11)  9(A)
, No
pain
17:12:03 62     17     99   36.2    138/93(117)  NSR     0 (11)  10(A)
, No
pain
Medications
Time     Medication       Route   Dose  Verified Delivered Reason          Notes
    Effectiveness
by       by
16:49:37 Oxygen           etCO2   2     Tony  Buffie    used for
Nasal   l/min Lanre Main RN   procedure
cannula
16:50:43 Lidocaine 2%     added   20ml  Tony  Tony   for local
to      vial  Lanre De Dios MD  anesthetic
field
16:50:50 Heparin Flush    added   2     Tnoy  Tony   used for
Bag              to      bags  Lanre De Dios MD  procedure
(1000units/500ml field
NS)
16:50:59 0.9% NaCl        I.V.    100   Tony  Buffie    Per physician
ml/hr Lanre Main RN
16:52:08 Versed           I.V.    2 mg  Tony Peñalozaie    for sedation
Lanre Mian RN
16:52:16 Fentanyl         I.V.    100   Tony Peñalozaie    for sedation
mcg   Lanre Main RN
16:56:47 Versed           I.V.    1 mg  Tony Peñalozaie    for sedation
Lanre Main RN
16:56:51 Fentanyl         I.V.    50    Tony Peñalozaie    for sedation
mcg   Lanre Main RN
17:01:08 Heparin Bolus    I.V.    4000  Tony Peñalozaie    for             verif
ied
units Lanre Main RN   anticoagulation with dr de dios
Procedure Log
Time     Note
16:15:09 Sammie Whittaker RT(R) sent for patient. Start room use.
16:29:00 Informed consent obtained and on chart
16:29:03 Admit Source: Other
16:31:07 Diagnostic Cath status Elective
16:31:13 Time tracking: Regular hours (M-F 7:00 - 5:00)
 
16:31:17 Plan of Care:Hemodynamics will remain stable., Cardiac rhythm will
remain stable., Comfort level will be maintained., Respiratory function
will remain adequate., Patient/ family verbilizes understanding of
procedure., Procedure tolerated without complication., Recovers from
procedure without complications..
16:38:52 Patient received from Med II to CCL 1 Alert and oriented. Tansferred to
table in Supine position.
16:38:53 Warm blankets applied, and kacey hugger turned on for patient comfort.
16:38:53 Correct patient and procedure confirmed by team.
16:38:54 ECG and BP/O2 sat monitors applied to patient.
16:38:55 Pre-procedure instructions explained to patient.
16:38:55 Pre-op teaching completed and patient verbalized understanding.
16:38:57 Family in patients room.
16:38:58 Patient NPO since Midnight.
16:45:58 Patient allergic to No known allergies
16:46:59 Is the patient allergic to Iodine/contrast media? No.
16:47:07 Is patient on blood thinner?Yes
16:47:10 **ACC** The patient was administered the following blood thiners within
the last 24 hours: **ACC**Plavix
16:47:41 Patient diabetic? No.
16:47:41 ----Pre-sedation anethsthesia assessment.----
16:47:44 Previous problem with sedation/anesthesia? N/A ?
16:47:46 Previous problem with sedation/anesthesia? No ?
16:47:47 Snore? Yes
16:47:49 Sleep apnea? Yes
16:47:50 Deviated septum? No
16:47:52 Opens mouth fully? Yes
16:47:53 Sticks out tongue? Yes
16:47:58 Airway obstruction? No ?
16:48:01 Dentures? No ?
16:48:04 Pre procedure: right dorsailis pedis pulse 2+ Normal; easily
identifiable; not easily obliterated
16:48:11 Modified Bryce's test Ulnar > 7 seconds.
16:48:14 Patient pain scale 0/10 ?.
16:48:30 IV patent on arrival in left forearm with 0.9% NaCl at 10ml/hr.
16:48:57 Lab Result : Creatinine 1.7 mg/dl
16:48:57 Lab Result : BUN 14 mg/dl
16:48:57 Lab Result : Hemoglobin 15.4 g/dl
16:49:10 Patient Height : 71 inches
16:49:19 Patient Weight : 241 lbs
16:49:37 Oxygen 2 l/min etCO2 Nasal cannula was administered by Cooper Main RN;
used for procedure;
16:49:53 Lab results completed and on chart.
16:49:56 Right groin area was prepped with chlora-prep and draped in sterile
fashion
16:49:57 Alarms reviewed by JHONATAN BOWMAN
16:49:57 -----------------------------------------------------------------------
-
16:49:58 Sharps counted by scrub and verified by KEIRA
16:49:59 Physician arrived
16:50:00 --------ALL STOP TIME OUT------
16:50:00 Final Timeout: patient, procedure, and site verified with staff and
physician. All members of the team are in agreement.
16:50:02 Right groin site verified by team.
16:50:14 Maximum allowable Isovue 300 dose 77.7ml. Physician notified. (300ml fo
r
normal creatinines. For patients with creatinine of 1.7 or higher
multiply weight(kg) x 5 divided by creatinine.)
16:50:43 Lidocaine 2% 20ml vial added to field was administered by Tony De Dios MD; for local anesthetic;
 
16:50:50 Heparin Flush Bag (1000units/500ml NS) 2 bags added to field was
administered by Tony De Dios MD; used for procedure;
16:50:59 0.9% NaCl 100 ml/hr I.V. was administered by Cooper Main RN; Per
physician;
16:51:20 Fire Safety Assessment: A--An alcohol-based skin anteseptic being used
preoperatively., C--Open oxygen or nitrous oxide is being used., D--An
ESU, laser, or fiber-optic light is being used.
16:51:26 Physical assessment completed. ASA score P 2 - A patient with mild
systemic disease as per Tony De Dios MD.
16:51:31 Sedation plan: IV Moderate Sedation Medication:Versed, Fentanyl
16:52:08 Versed 2 mg I.V. was administered by Cooper Main RN; for sedation;
16:52:16 Fentanyl 100 mcg I.V. was administered by Cooper Main RN; for sedation;
16:53:12 Procedure started.
16:53:12 Full Disclosure recording started
16:53:15 Local anesthetic to right femoral artery with Lidocaine 2% by Tony De Dios MD.**INITIAL ACCESS ONLY**
16:53:25 A 5 Fr sheath was inserted into the Right Femoral artery
16:53:55 Vital chart was started
16:54:19 Use device set Femoral Dx
16:54:20 ACIST Syringe (77172) opened to sterile field.
16:54:20 Bag Decanter (2002S) opened to sterile field.
16:54:21 Medline Cath Pack ([id number]) opened to sterile field.
16:54:21 DIAGNOSTIC WIRE .035 260cm J wire (009458) opened to sterile field.
16:54:22 ACIST Hand Control (92554) opened to sterile field.
16:54:28 A MULTIPACK Pigtail 5 Fr catheter was advanced over the wire and used
for LV Angiography.
16:54:32 LV gram done using JOHANSEN
16:54:36 Injector settings: Ml/sec: 10, Volume: 20,
16:54:42 EF : 50 %
16:54:44 Catheter removed.
16:54:48 ACIST Manifold (23944) opened to sterile field.
16:54:49 DIAGNOSTIC Multipack 5Fr catheter set (YG7559) opened to sterile field.
16:54:50 Tegaderm 4 x 4 (1626W) opened to sterile field.
16:54:50 SHEATH 5FR [place] (NAD025) opened to sterile field.
16:55:05 A MULTIPACK JL 4.0 5Fr catheter was advanced over the wire and used for
Left Coronary Angiography.
16:55:48 Catheter removed.
16:56:04 A MULTIPACK 3DRC 5Fr catheter was advanced over the wire and used for
Internal mammary arteriography. TO LAD
16:56:20 GLIDE WIRE Super Stiff Angled 260cm (EY8444) opened to sterile field.
16:56:44 SS GILDE wire advanced.
16:56:47 Versed 1 mg I.V. was administered by Cooper Main RN; for sedation;
16:56:51 Fentanyl 50 mcg I.V. was administered by Cooper Main RN; for sedation;
16:58:59 A MULTIPACK 3DRC 5Fr catheter was advanced over the wire and used for
SVG Angiography.TO OCCLUDED VESSEL?
16:59:06 Catheter removed.
16:59:38 A DIAGNOSTIC AR2 MOD 5 Fr catheter (100027H) was advanced over the wire
and used for SVG Angiography. TO CIRC
17:00:23 Catheter removed.
17:00:31 Use device set LANRE PCI
17:00:33 SHEATH 6FR [place] (CNE470) opened to sterile field.
17:00:37 INFLATOR Merit [place] (FO9551) opened to sterile field.
17:00:39 CHOICE PT Extra Support 182cm wire ([id number]) opened to sterile field.
17:00:55 Sheath upsized to a 6 Fr Short.
17:01:08 Heparin Bolus 4000 units I.V. was administered by Cooper Main RN; for
anticoagulation; verified with dr de dios
17:01:41 6 Fr AR 2.0 guide catheter was inserted over the wire
17:02:18 GUIDE 6FR AR 2.0 catheter ([id number]) opened to sterile field.
17:02:26 CHOICE PT ES wire advanced.
17:06:47 Place stent Inflation Number: 1 A ALLEGRA RX 2.5 x 08 stent ([id number])
 
was prepped and advanced across the Aorta Left -> Mid CX. The stent was
deployed at 11 ESME for 0:09 (min:sec).
17:08:13 Inflation number: 2 The stent balloon was then re-inflated across the
Aorta Left -> Mid CX to 7 ESME for 0:05 (min:sec).
17:08:21 Inflation number: 3 The stent balloon was then re-inflated across the
Aorta Left -> Mid CX to 11 ESME for 0:04 (min:sec).
17:08:41 Inflation number: 4 The stent balloon was then re-inflated across the
Aorta Left -> Mid CX to 15 ESME for 0:05 (min:sec).
17:09:05 Stent catheter was removed intact over wire.
17:09:05 Wire removed.
17:09:06 Guide catheter removed.
17:09:16 Sheath removed intact; hemostasis achieved with Exoseal to the Right
Femoral artery.
17:09:17 Procedure ended.(Physican Out)
17:09:36 Fluoroscopy time 08.10 minutes.
17:09:41 Flurop Dose total: 1098
17:09:41 Fluoroscopy dose: 1098 mGy
17:09:52 Contrast amount:Isovue 300 164ml.
17:09:53 Sharps counted by scrub and verified by R.N.
17:09:54 Insertion/operative site no bleeding no hematoma.
17:09:57 Post-op/insertion site Right Femoral artery dressed using a 4 x 4 and
Tegaderm.
17:10:00 Post right femoral artery:stable, clean and dry
17:10:02 Post Procedure Pulses reassessed and unchanged
17:10:04 Post-procedure physical assessment completed. ASA score P 2 - A patient
with mild systemic disease as per Tony De Dios MD.
17:10:06 Post procedure rhythm: unchanged.
17:10:11 Estimated blood loss: 10 ml
17:10:13 Post procedure instruction explained to patient.Patient verbalizes
understanding.
17:10:14 Patient needs reinforcement of post procedure teaching.
17:10:16 See physician's report for complete and final results.
17:11:44 Procedure type changed to Cath procedure, Diagnostic procedure, LHC, LH
C
w/Coronaries w/Grafts, Sedation Charges, Moderate Sedation up to 15
minutes, PCI procedure, AMI/SVG/ PTCA or Stent, SVG-BMS/ROCCO Initial
17:11:49 Procedure Complication : No complications
17:11:56 EXOSEAL 6Fr () opened to sterile field.
17:12:57 Procedure and supply charges have been captured, reviewed, submitted an
d
are correct.
17:13:04 Vital chart was stopped
17:13:08 Report given to PCU.
17:13:14 Patient transfered to PCU with Bed.
17:13:32 Procedure ended.
17:13:32 Full Disclosure recording stopped
17:13:35 End room use (Document Last)
Intervention Summary
Intervention Notes
Time     ActionType  Lesion and  Equipment Used Action#  Pressure  Duration
Attributes
17:06:47 Place stent Aorta Left  ALLEGRA RX 2.5 x  1        11        00:09
-> Mid CX   08 stent
([id number])
17:08:13 Reinflate   Aorta Left  ALLEGRA RX 2.5 x  2        7         00:05
stent       -> Mid CX   08 stent
balloon                 ([id number])
17:08:21 Reinflate   Aorta Left  ALLEGRA RX 2.5 x  3        11        00:04
stent       -> Mid CX   08 stent
balloon                 ([id number])
 
17:08:41 Reinflate   Aorta Left  ALLEGRA RX 2.5 x  4        15        00:05
stent       -> Mid CX   08 stent
balloon                 ([id number])
Device Usage
Item Name      Manufacture  Quantity  Catalog Number Hospital Part     Current M
inimal Lot# /
Charge   Number   Stock   Stock   Serial#
Code
ACIST Syringe  Acist        1         30181          994290   544586   162046  2
0
(22874)        Medical
Systems Inc
Bag Decanter   Microtek     1                   689637   01759    706755  5
()        Medical Inc.
Medline Cath   Medline      1         [id number]      153220   64627    242054  5
Pack
([id number])
DIAGNOSTIC     [place]      1         119437         892894   897853   345897  3
0
WIRE .035
260cm J wire
(362573)
ACIST Hand     Acist        1         24904          874669   531548   947222  5
Control        Medical
(17941)        Systems Inc
MULTIPACK      Cardinal     1                                          779951  5
Pigtail 5 Fr   Health
catheter
ACIST Manifold Acist        1         38042          088503   914634   364790  5
(19058)        Medical
Systems Inc
DIAGNOSTIC     Cardinal     1         NU2148         570425   71000    742387  3
0
Multipack 5Fr  Health
catheter set
(AY6716)
Tegaderm 4 x 4 3M           1         1626W          223432   767865   705990  5
(1626W)
SHEATH 5FR     Terumo       1         OUD465         029181   924015   383545  5
[place]
(YDB297)
MULTIPACK JL   Cardinal     1                                          243944  5
4.0 5Fr        Health
catheter
MULTIPACK 3DRC Cardinal     1                                          621439  5
5Fr catheter   Health
GLIDE WIRE     Terumo       1         VY9969         331605   969080   993597  5
Super Stiff
Angled 260cm
(VA1245)
DIAGNOSTIC AR2 Cardinal     1         662128S        989955   906241   158265  2
0
MOD 5 Fr       Health
catheter
(979564I)
SHEATH 6FR     Terumo       1         OUT562         222583   634739   294571  4
0
[place]
(ETS215)
INFLATOR Merit Merit        1         SP5627         567249   674283   618311  1
 
5
Mediafly
(IH0154)
CHOICE PT      [place]       1         [id number]  527275   289877   473569  5
Extra Support  Scientific
182cm wire
([id number])
GUIDE 6FR AR   Medtronic    1         [id number]        956803   71087    424815  1
2.0 catheter
(AF8HB38)
ALLEGRA RX 2.5 x  Medtronic    1         [id number]   691749   2888839  883169  5
       [id number]
08 stent
([id number])
EXOSEAL 6Fr    Cardinal     1                   467414   273187   966371  1
0
()        Health
Signature Audit [place]
Stage           Time        Signature      Unsigned
Intra-Procedure 3/11/2019   Kirsten
5:13:46 PM  Counts RT(R)
Signatures
Monitor : Kirsten     Signature :
Counts RT               ______________________________
Date : ______________ Time :
______________
 
 
 
 
 
 
 
 
 
 
 
 
 
 
 
 
 
 
 
 
 
 
 
 
 
 
 
 
 
[id number] [place] [address]

## 2019-03-10 NOTE — OP
PATIENT NAME:  SUSAN LÓPEZ                          MEDICAL RECORD: [id number]
:65                                             LOCATION:D.M2     D.2123
                                                         ADMISSION DATE:19
SURGEON:  SHYAM MARTINEZ MD             
 
 
DATE OF OPERATION:  2019
 
PROCEDURES:
1. PTCA stent left circumflex through the vein graft.
2. Left heart catheterization.
3. Selective coronary angiography.
4. Vein graft angiography.
5. LIMA angiography.
6. Left ventriculogram.
 
INDICATION:  Angina and coronary artery disease.
 
PROCEDURE IN DETAIL:  After informed consent was obtained and after a detailed
description of risks, benefits as well as alternative therapies, the patient
elected to proceed with angiogram and angioplasty.  The right femoral area was
prepped and draped in normal sterile fashion.  Right femoral artery was
cannulated via modified Seldinger technique with the placement of 6-Danish
sheath.  All catheters exchanged through this sheath.
 
FINDINGS:  Left ventriculogram was performed in standard 30-degree JOHANSEN view,
reveals good cardiac wall motion throughout all segments, overall ejection
fraction estimated at 55%.
 
SELECTIVE CORONARY ANGIOGRAPHY:
1. Left main is with no significant angiographic disease.
2. Left anterior descending is closed.
3. Left circumflex is closed.
4. Right coronary artery is closed.
5. Vein graft to the right coronary artery is closed.
6. LIMA to the LAD is patent.  The LAD has a 70+% stenosis after the patent
LIMA.
7. The left vein graft to the ramus intermedius is patent.  Distal ramus
intermedius is widely patent.
8. Vein graft to the obtuse marginal circumflex is patent; however, there is a
90% stenosis after that drop down.
 
PTCA STENT OF THE LEFT CIRCUMFLEX THROUGH THE PATENT VEIN GRAFT:  The stent used
was a 2.5 x 8 mm Braden.  Result was 0% residual stenosis.
 
OVERALL IMPRESSION:  Successful percutaneous transluminal coronary angioplasty
stent of the left circumflex through the patent vein graft going from 90%
initial stenosis to 0% residual.
 
TRANSINT:[id number] Voice Confirmation ID: [id number] DOCUMENT ID: [id number]
                                           
 
 
 
OPERATIVE REPORT                               [id number]    SUSAN LÓPEZ        
 
 
                                           SHYAM MARTINEZ MD             
 
 
 
 
 
 
 
 
 
 
 
 
 
 
 
 
 
 
 
 
 
 
 
 
 
 
 
 
 
 
 
 
 
 
 
 
 
 
 
 
 
 
 
 
 
 
CC:                                                             [id number]
DICTATION DATE: 19     :     19 0117      DIS IN  
                                                                      19
[id number] [place] [id number]

## 2019-03-11 VITALS — SYSTOLIC BLOOD PRESSURE: 127 MMHG | DIASTOLIC BLOOD PRESSURE: 78 MMHG

## 2019-03-11 VITALS — DIASTOLIC BLOOD PRESSURE: 85 MMHG | SYSTOLIC BLOOD PRESSURE: 135 MMHG

## 2019-03-11 VITALS — SYSTOLIC BLOOD PRESSURE: 132 MMHG | DIASTOLIC BLOOD PRESSURE: 89 MMHG

## 2019-03-11 VITALS — SYSTOLIC BLOOD PRESSURE: 134 MMHG | DIASTOLIC BLOOD PRESSURE: 84 MMHG

## 2019-03-11 VITALS — DIASTOLIC BLOOD PRESSURE: 94 MMHG | SYSTOLIC BLOOD PRESSURE: 148 MMHG

## 2019-03-11 VITALS — DIASTOLIC BLOOD PRESSURE: 82 MMHG | SYSTOLIC BLOOD PRESSURE: 139 MMHG

## 2019-03-11 VITALS — SYSTOLIC BLOOD PRESSURE: 155 MMHG | DIASTOLIC BLOOD PRESSURE: 97 MMHG

## 2019-03-11 LAB
ANION GAP SERPL CALC-SCNC: 10.3 MMOL/L (ref 8–16)
BASOPHILS NFR BLD AUTO: 0.5 % (ref 0–2)
BUN SERPL-MCNC: 14 MG/DL (ref 7–18)
CALCIUM SERPL-MCNC: 8.2 MG/DL (ref 8.5–10.1)
CHLORIDE SERPL-SCNC: 111 MMOL/L (ref 98–107)
CK MB SERPL-MCNC: 1.8 U/L (ref 0–3.6)
CK SERPL-CCNC: 113 UL (ref 21–232)
CO2 SERPL-SCNC: 29 MMOL/L (ref 21–32)
CREAT SERPL-MCNC: 1.7 MG/DL (ref 0.6–1.3)
EOSINOPHIL NFR BLD: 4.8 % (ref 0–7)
ERYTHROCYTE [DISTWIDTH] IN BLOOD BY AUTOMATED COUNT: 14.3 % (ref 11.5–14.5)
GLUCOSE SERPL-MCNC: 104 MG/DL (ref 74–106)
HCT VFR BLD CALC: 43.7 % (ref 42–54)
HGB BLD-MCNC: 15.2 G/DL (ref 13.5–17.5)
IMM GRANULOCYTES NFR BLD: 0.4 % (ref 0–5)
LYMPHOCYTES NFR BLD AUTO: 24.5 % (ref 15–50)
MCH RBC QN AUTO: 31.4 PG (ref 26–34)
MCHC RBC AUTO-ENTMCNC: 34.8 G/DL (ref 31–37)
MCV RBC: 90.3 FL (ref 80–100)
MONOCYTES NFR BLD: 7.2 % (ref 2–11)
NEUTROPHILS NFR BLD AUTO: 62.6 % (ref 40–80)
OSMOLALITY SERPL CALC.SUM OF ELEC: 291 MOSM/KG (ref 275–300)
PLATELET # BLD: 127 10X3/UL (ref 130–400)
PMV BLD AUTO: 10 FL (ref 7.4–10.4)
POTASSIUM SERPL-SCNC: 4.3 MMOL/L (ref 3.5–5.1)
RBC # BLD AUTO: 4.84 10X6/UL (ref 4.2–6.1)
SODIUM SERPL-SCNC: 146 MMOL/L (ref 136–145)
TROPONIN I SERPL-MCNC: 0.04 NG/ML (ref 0–0.06)
WBC # BLD AUTO: 5.7 10X3/UL (ref 4.8–10.8)

## 2019-03-11 NOTE — NUR
NPO PAST BREAKFAST FOR HEART CATH. PATIENT HAS USED THE STERILE WIPES. DENIES
ANY NEEDS AT THIS TIME.

## 2019-03-11 NOTE — NUR
RETURNS FROM CATH LAB RECOVERY WITH DRESSING C/D/I TO RIGHT GROIN. TO LAY FLAT
X 4 HOURS. FAMILY IS AT [facility]. WILL MONITOR

## 2019-03-11 NOTE — NUR
PT ARRIVED VIA W/C FORM ER AT 0140 HRS.  PT DENIED ANY DISCOMFORT.  IV TO LFA
WITH NS AT 125CC/HR.  IV PATENT.  VSS.  SR WITH OCC PVC'S PER CM HR 62.  LUNGS
ESSENTIALLY CTA.  ADMISSION ASSESSMENT, HISTORY AND HOME MED LIST COMPLETED.
EXPLAINED RATIONALE  FOR NPO UNTIL SEEN BY DR MARTINEZ.  PT STATED UNDERSTANDING.
 NON SLIP SOCKS ON PT.  SR UP X1, CALL LIGHT WITHIN REACH.

## 2019-03-11 NOTE — NUR
ROUNDING DONE WITH WITH PATIENT BEING NPO UNTIL SEEN BY CARDIOLOGIST. ON HEART
MONITOR SHOWING SR W OCC PVC. ON ROOM AIR. LEFT FA PIV SEEN WITH NS INFUSING
 CC/HR. DENIES NEEDS OR CHEST PAIN AT PRESENT TIME. SOPHIA DALE.

## 2019-03-11 NOTE — NUR
VSS THROUGHOUT NIGHT.  SR WITH OCC PVC'S PER CM  PT DENIED ANY DISCOMFORT.
NEEDS MET; WILL CONTINUE TO MONITOR.

## 2019-03-11 NOTE — NUR
RESUMED CARE OF PT, LYING IN BED RESPIRATIONS EVEN AND UNLABORED ON 2LPM VIA
NC.  64 SR WITH PVCS ON TELEMETRY.  RIGHT GROIN C/D/I, PEDAL PULSE PALPABLE.
LEFT FOREARM INFUSING NS @ 125.  CALL LIGHT IN REACH.  SEE NURSE ASSESSMENT.

## 2019-07-31 ENCOUNTER — HOSPITAL ENCOUNTER (INPATIENT)
Dept: HOSPITAL 84 - D.ER | Age: 54
LOS: 2 days | Discharge: HOME | DRG: 305 | End: 2019-08-02
Attending: INTERNAL MEDICINE | Admitting: INTERNAL MEDICINE
Payer: MEDICAID

## 2019-07-31 VITALS — SYSTOLIC BLOOD PRESSURE: 140 MMHG | DIASTOLIC BLOOD PRESSURE: 100 MMHG

## 2019-07-31 VITALS
BODY MASS INDEX: 35.77 KG/M2 | HEIGHT: 71 IN | BODY MASS INDEX: 35.77 KG/M2 | BODY MASS INDEX: 35.77 KG/M2 | HEIGHT: 71 IN | WEIGHT: 255.54 LBS | WEIGHT: 255.54 LBS

## 2019-07-31 VITALS — DIASTOLIC BLOOD PRESSURE: 92 MMHG | SYSTOLIC BLOOD PRESSURE: 135 MMHG

## 2019-07-31 VITALS — SYSTOLIC BLOOD PRESSURE: 136 MMHG | DIASTOLIC BLOOD PRESSURE: 100 MMHG

## 2019-07-31 VITALS — DIASTOLIC BLOOD PRESSURE: 89 MMHG | SYSTOLIC BLOOD PRESSURE: 142 MMHG

## 2019-07-31 VITALS — SYSTOLIC BLOOD PRESSURE: 149 MMHG | DIASTOLIC BLOOD PRESSURE: 94 MMHG

## 2019-07-31 DIAGNOSIS — K21.9: ICD-10-CM

## 2019-07-31 DIAGNOSIS — I48.91: ICD-10-CM

## 2019-07-31 DIAGNOSIS — I10: Primary | ICD-10-CM

## 2019-07-31 DIAGNOSIS — E03.9: ICD-10-CM

## 2019-07-31 DIAGNOSIS — E11.9: ICD-10-CM

## 2019-07-31 DIAGNOSIS — I25.10: ICD-10-CM

## 2019-07-31 DIAGNOSIS — F17.223: ICD-10-CM

## 2019-07-31 DIAGNOSIS — E78.5: ICD-10-CM

## 2019-07-31 LAB
ALBUMIN SERPL-MCNC: 3.9 G/DL (ref 3.4–5)
ALP SERPL-CCNC: 124 U/L (ref 46–116)
ALT SERPL-CCNC: 28 U/L (ref 10–68)
ANION GAP SERPL CALC-SCNC: 12 MMOL/L (ref 8–16)
APTT BLD: 27.6 SECONDS (ref 22.8–39.4)
BASOPHILS NFR BLD AUTO: 0.2 % (ref 0–2)
BILIRUB SERPL-MCNC: 0.85 MG/DL (ref 0.2–1.3)
BUN SERPL-MCNC: 21 MG/DL (ref 7–18)
CALCIUM SERPL-MCNC: 8.2 MG/DL (ref 8.5–10.1)
CHLORIDE SERPL-SCNC: 106 MMOL/L (ref 98–107)
CK MB SERPL-MCNC: 1.3 U/L (ref 0–3.6)
CK MB SERPL-MCNC: 1.6 U/L (ref 0–3.6)
CK SERPL-CCNC: 75 UL (ref 21–232)
CK SERPL-CCNC: 97 UL (ref 21–232)
CO2 SERPL-SCNC: 27 MMOL/L (ref 21–32)
CREAT SERPL-MCNC: 1.8 MG/DL (ref 0.6–1.3)
EOSINOPHIL NFR BLD: 3.3 % (ref 0–7)
ERYTHROCYTE [DISTWIDTH] IN BLOOD BY AUTOMATED COUNT: 13.7 % (ref 11.5–14.5)
GLOBULIN SER-MCNC: 3.1 G/L
GLUCOSE SERPL-MCNC: 89 MG/DL (ref 74–106)
HCT VFR BLD CALC: 43.5 % (ref 42–54)
HGB BLD-MCNC: 15.7 G/DL (ref 13.5–17.5)
IMM GRANULOCYTES NFR BLD: 0.4 % (ref 0–5)
INR PPP: 1.23 (ref 0.85–1.17)
LYMPHOCYTES NFR BLD AUTO: 20.8 % (ref 15–50)
MAGNESIUM SERPL-MCNC: 2 MG/DL (ref 1.8–2.4)
MCH RBC QN AUTO: 32.1 PG (ref 26–34)
MCHC RBC AUTO-ENTMCNC: 36.1 G/DL (ref 31–37)
MCV RBC: 89 FL (ref 80–100)
MONOCYTES NFR BLD: 10.8 % (ref 2–11)
NEUTROPHILS NFR BLD AUTO: 64.5 % (ref 40–80)
OSMOLALITY SERPL CALC.SUM OF ELEC: 282 MOSM/KG (ref 275–300)
PLATELET # BLD: 138 10X3/UL (ref 130–400)
PMV BLD AUTO: 9.1 FL (ref 7.4–10.4)
POTASSIUM SERPL-SCNC: 4 MMOL/L (ref 3.5–5.1)
PROT SERPL-MCNC: 7 G/DL (ref 6.4–8.2)
PROTHROMBIN TIME: 15 SECONDS (ref 11.6–15)
RBC # BLD AUTO: 4.89 10X6/UL (ref 4.2–6.1)
SODIUM SERPL-SCNC: 141 MMOL/L (ref 136–145)
TROPONIN I SERPL-MCNC: 0.02 NG/ML (ref 0–0.06)
TROPONIN I SERPL-MCNC: < 0.017 NG/ML (ref 0–0.06)
WBC # BLD AUTO: 5.5 10X3/UL (ref 4.8–10.8)

## 2019-07-31 NOTE — NUR
SPOKE WITH TREATING PROVIDER SABINA GONZALEZ, PT DENIES CP CURRENTLY, NO SL NITRO
GIVEN AT THIS TIME. PT INSTRUCTED TO NOTIFY STAFF IF HE BEGINS HAVING CP. CALL
LIGHT IN REACH, PT VOICED UNDERSTANDING.

## 2019-07-31 NOTE — NUR
PATIENT ARRIVED FROM ER. PATIENT IS ALERT AND ORIENTED, RESTING COMFORTABLY IN
BED. RESPIRATIONS ARE EVEN AND UNLABORED. NO S/S OF DISTRESS. NO C/O PAIN.
CALL LIGHT WITHIN REACH. WILL CPOC.

## 2019-07-31 NOTE — NUR
UPDATED PATIENT MED REC. PATIENT IS UNCERTAIN IF HE STILL TAKES THE METOPROLOL
OR LISINOPRIL. REMOVED MEDICATIONS FROM MED REC.

## 2019-08-01 VITALS — DIASTOLIC BLOOD PRESSURE: 81 MMHG | SYSTOLIC BLOOD PRESSURE: 131 MMHG

## 2019-08-01 VITALS — DIASTOLIC BLOOD PRESSURE: 86 MMHG | SYSTOLIC BLOOD PRESSURE: 147 MMHG

## 2019-08-01 VITALS — SYSTOLIC BLOOD PRESSURE: 154 MMHG | DIASTOLIC BLOOD PRESSURE: 94 MMHG

## 2019-08-01 VITALS — DIASTOLIC BLOOD PRESSURE: 90 MMHG | SYSTOLIC BLOOD PRESSURE: 153 MMHG

## 2019-08-01 VITALS — SYSTOLIC BLOOD PRESSURE: 150 MMHG | DIASTOLIC BLOOD PRESSURE: 89 MMHG

## 2019-08-01 VITALS — SYSTOLIC BLOOD PRESSURE: 148 MMHG | DIASTOLIC BLOOD PRESSURE: 84 MMHG

## 2019-08-01 LAB
BASOPHILS NFR BLD AUTO: 0.4 % (ref 0–2)
CK MB SERPL-MCNC: 1.7 U/L (ref 0–3.6)
CK SERPL-CCNC: 81 UL (ref 21–232)
EOSINOPHIL NFR BLD: 4.8 % (ref 0–7)
ERYTHROCYTE [DISTWIDTH] IN BLOOD BY AUTOMATED COUNT: 13.9 % (ref 11.5–14.5)
HCT VFR BLD CALC: 42.5 % (ref 42–54)
HGB BLD-MCNC: 15.1 G/DL (ref 13.5–17.5)
IMM GRANULOCYTES NFR BLD: 0.4 % (ref 0–5)
LYMPHOCYTES NFR BLD AUTO: 29.1 % (ref 15–50)
MAGNESIUM SERPL-MCNC: 2.1 MG/DL (ref 1.8–2.4)
MCH RBC QN AUTO: 31.7 PG (ref 26–34)
MCHC RBC AUTO-ENTMCNC: 35.5 G/DL (ref 31–37)
MCV RBC: 89.1 FL (ref 80–100)
MONOCYTES NFR BLD: 9.5 % (ref 2–11)
NEUTROPHILS NFR BLD AUTO: 55.8 % (ref 40–80)
PHOSPHATE SERPL-MCNC: 4.3 MG/DL (ref 2.5–4.9)
PLATELET # BLD: 124 10X3/UL (ref 130–400)
PMV BLD AUTO: 9.5 FL (ref 7.4–10.4)
RBC # BLD AUTO: 4.77 10X6/UL (ref 4.2–6.1)
TROPONIN I SERPL-MCNC: < 0.017 NG/ML (ref 0–0.06)
WBC # BLD AUTO: 5.5 10X3/UL (ref 4.8–10.8)

## 2019-08-01 NOTE — NUR
RESUMING PATIENT CARE. PATIENT IS ALERT AND ORIENTED, RESTING COMFORTABLY IN
BED. RESPIRATIONS ARE EVEN AND UNLABORED. NO S/S OF DISTRESS. NO C/O PAIN.
CALL LIGHT WITHIN REACH. WILL CPOC.

## 2019-08-01 NOTE — NUR
RECEIVED PT IN BED AROUSES EASILY RESP NOTED WITH SOME SLEEP APNEA PT DENIES
ANY NEEDS SKIN W/D COLOR WNL NAD NOTED

## 2019-08-02 ENCOUNTER — HOSPITAL ENCOUNTER (OUTPATIENT)
Dept: HOSPITAL 84 - D.ER | Age: 54
Setting detail: OBSERVATION
LOS: 1 days | Discharge: HOME | End: 2019-08-03
Attending: INTERNAL MEDICINE | Admitting: INTERNAL MEDICINE
Payer: MEDICAID

## 2019-08-02 VITALS
HEIGHT: 71 IN | WEIGHT: 25.05 LBS | WEIGHT: 25.05 LBS | BODY MASS INDEX: 3.51 KG/M2 | BODY MASS INDEX: 3.51 KG/M2 | HEIGHT: 71 IN

## 2019-08-02 VITALS — SYSTOLIC BLOOD PRESSURE: 123 MMHG | DIASTOLIC BLOOD PRESSURE: 88 MMHG

## 2019-08-02 VITALS — SYSTOLIC BLOOD PRESSURE: 109 MMHG | DIASTOLIC BLOOD PRESSURE: 82 MMHG

## 2019-08-02 VITALS — DIASTOLIC BLOOD PRESSURE: 98 MMHG | SYSTOLIC BLOOD PRESSURE: 167 MMHG

## 2019-08-02 VITALS — DIASTOLIC BLOOD PRESSURE: 102 MMHG | SYSTOLIC BLOOD PRESSURE: 155 MMHG

## 2019-08-02 VITALS — SYSTOLIC BLOOD PRESSURE: 152 MMHG | DIASTOLIC BLOOD PRESSURE: 83 MMHG

## 2019-08-02 DIAGNOSIS — F17.213: ICD-10-CM

## 2019-08-02 DIAGNOSIS — I10: Primary | ICD-10-CM

## 2019-08-02 DIAGNOSIS — I25.119: ICD-10-CM

## 2019-08-02 DIAGNOSIS — E11.9: ICD-10-CM

## 2019-08-02 DIAGNOSIS — K21.9: ICD-10-CM

## 2019-08-02 DIAGNOSIS — E78.5: ICD-10-CM

## 2019-08-02 LAB
ALBUMIN SERPL-MCNC: 4 G/DL (ref 3.4–5)
ALP SERPL-CCNC: 118 U/L (ref 46–116)
ALT SERPL-CCNC: 29 U/L (ref 10–68)
ANION GAP SERPL CALC-SCNC: 12.6 MMOL/L (ref 8–16)
ANION GAP SERPL CALC-SCNC: 13.1 MMOL/L (ref 8–16)
APTT BLD: 22.7 SECONDS (ref 22.8–39.4)
BASOPHILS NFR BLD AUTO: 0.7 % (ref 0–2)
BILIRUB SERPL-MCNC: 0.71 MG/DL (ref 0.2–1.3)
BUN SERPL-MCNC: 21 MG/DL (ref 7–18)
BUN SERPL-MCNC: 22 MG/DL (ref 7–18)
CALCIUM SERPL-MCNC: 7.9 MG/DL (ref 8.5–10.1)
CALCIUM SERPL-MCNC: 8.1 MG/DL (ref 8.5–10.1)
CHLORIDE SERPL-SCNC: 107 MMOL/L (ref 98–107)
CHLORIDE SERPL-SCNC: 109 MMOL/L (ref 98–107)
CK MB SERPL-MCNC: 2.1 U/L (ref 0–3.6)
CK SERPL-CCNC: 146 UL (ref 21–232)
CO2 SERPL-SCNC: 24.3 MMOL/L (ref 21–32)
CO2 SERPL-SCNC: 24.7 MMOL/L (ref 21–32)
CREAT SERPL-MCNC: 1.9 MG/DL (ref 0.6–1.3)
CREAT SERPL-MCNC: 1.9 MG/DL (ref 0.6–1.3)
EOSINOPHIL NFR BLD: 4 % (ref 0–7)
ERYTHROCYTE [DISTWIDTH] IN BLOOD BY AUTOMATED COUNT: 13.8 % (ref 11.5–14.5)
ERYTHROCYTE [DISTWIDTH] IN BLOOD BY AUTOMATED COUNT: 14.1 % (ref 11.5–14.5)
GLOBULIN SER-MCNC: 2.9 G/L
GLUCOSE SERPL-MCNC: 85 MG/DL (ref 74–106)
GLUCOSE SERPL-MCNC: 91 MG/DL (ref 74–106)
HCT VFR BLD CALC: 41.4 % (ref 42–54)
HCT VFR BLD CALC: 44.1 % (ref 42–54)
HGB BLD-MCNC: 14.6 G/DL (ref 13.5–17.5)
HGB BLD-MCNC: 15.5 G/DL (ref 13.5–17.5)
IMM GRANULOCYTES NFR BLD: 0.3 % (ref 0–5)
INR PPP: 1.19 (ref 0.85–1.17)
LYMPHOCYTES NFR BLD AUTO: 26.3 % (ref 15–50)
LYMPHOCYTES NFR BLD AUTO: 30 % (ref 15–50)
MAGNESIUM SERPL-MCNC: 2.2 MG/DL (ref 1.8–2.4)
MCH RBC QN AUTO: 31.5 PG (ref 26–34)
MCH RBC QN AUTO: 31.6 PG (ref 26–34)
MCHC RBC AUTO-ENTMCNC: 35.1 G/DL (ref 31–37)
MCHC RBC AUTO-ENTMCNC: 35.3 G/DL (ref 31–37)
MCV RBC: 89.6 FL (ref 80–100)
MCV RBC: 89.6 FL (ref 80–100)
MONOCYTES NFR BLD: 10 % (ref 2–11)
MONOCYTES NFR BLD: 10.6 % (ref 2–11)
NEUTROPHILS NFR BLD AUTO: 58.1 % (ref 40–80)
NEUTROPHILS NFR BLD AUTO: 60 % (ref 40–80)
OSMOLALITY SERPL CALC.SUM OF ELEC: 282 MOSM/KG (ref 275–300)
OSMOLALITY SERPL CALC.SUM OF ELEC: 285 MOSM/KG (ref 275–300)
PLATELET # BLD EST: NORMAL 10*3/UL
PLATELET # BLD: 127 10X3/UL (ref 130–400)
PLATELET # BLD: 141 10X3/UL (ref 130–400)
PMV BLD AUTO: 10 FL (ref 7.4–10.4)
PMV BLD AUTO: 9.3 FL (ref 7.4–10.4)
POTASSIUM SERPL-SCNC: 3.8 MMOL/L (ref 3.5–5.1)
POTASSIUM SERPL-SCNC: 3.9 MMOL/L (ref 3.5–5.1)
PROT SERPL-MCNC: 6.9 G/DL (ref 6.4–8.2)
PROTHROMBIN TIME: 14.6 SECONDS (ref 11.6–15)
RBC # BLD AUTO: 4.62 10X6/UL (ref 4.2–6.1)
RBC # BLD AUTO: 4.92 10X6/UL (ref 4.2–6.1)
SODIUM SERPL-SCNC: 141 MMOL/L (ref 136–145)
SODIUM SERPL-SCNC: 142 MMOL/L (ref 136–145)
TROPONIN I SERPL-MCNC: < 0.017 NG/ML (ref 0–0.06)
WBC # BLD AUTO: 5.7 10X3/UL (ref 4.8–10.8)
WBC # BLD AUTO: 6 10X3/UL (ref 4.8–10.8)

## 2019-08-02 NOTE — HP
PATIENT: SUSAN HEAD                                MEDICAL RECORD: [id number]
ACCOUNT: [id number]                                    LOCATION:[address]
: 65                                            ADMISSION DATE: 19
                                                         PCP: ISABEL OLIVIA MD    
 
                             HISTORY AND PHYSICAL EXAMINATION
 
 
ADMITTING DIAGNOSES:
1.  Angina.
2.  Hypertension, uncontrolled.
3.  Hyperlipidemia.
4.  Coronary artery disease.
5.  Status post coronary artery bypass graft surgery.
6.  Status post multivessel percutaneous transluminal coronary angioplasty
stent.
 
HISTORY OF PRESENT ILLNESS:  Mr. Head was just in the hospital with difficult
to control blood pressure and angina.  He returned within hours after discharge
with systolic blood pressures in the 160-180 range.  He is currently on
carvedilol 6.25 b.i.d., Imdur, Ranexa.  He is as well on aspirin, Plavix and
Lipitor.  His systolic blood pressure has been running since admission in the
150-180 range.
 
PHYSICAL EXAMINATION:
GENERAL APPEARANCE:  Well-nourished, well-developed, appears stated age.  Level
of distress, comfortable.
PSYCHIATRIC:  Mental status, alert, normal affect.  Orientation, oriented to
time, place and person.
EYES:  Lids and conjunctiva, noninjected.  No discharge, no pallor.
ENT:  Lips, teeth, gums, normal dentition.  Oropharynx, no cyanosis, no pallor.
NECK:  Carotid arteries, bilateral normal upstroke, no bruits, no thrills.
JUGULAR VEINS:  No jugular venous pressure or distention.
CERVICAL LYMPH NODES:  Nontender, nonenlarged.
THYROID:  Not enlarged.  Nontender.  No nodules.
LUNGS:  Respiratory effort, unlabored.
CHEST:  Normal curvature.  No thoracic deformity.  No chest wall tenderness. 
Percussion, resonant.  Auscultation, clear.  No wheezes, no rales, no rhonchi.
CARDIOVASCULAR:  Precordial exam, nondisplaced.  No heaves or pericardial
thrills.  Rate and rhythm, regular.  Heart sounds, normal S1, normal S2.  No S3,
no gallop, no rub.  Systolic murmur, not heard.  Diastolic murmur, not heard.
EXTREMITIES:  No cyanosis, no edema.  Peripheral pulses, full and equal in all
extremities, except as noted.  No bruits appreciated.
ABDOMEN:  Soft, nondistended.  Normal aorta.  No bruit.  Nontender.  No masses. 
Liver, nontender, no hepatomegaly.  Spleen, nontender, no splenomegaly.
MUSCULOSKELETAL:  No joint tenderness.  No joint swelling.  No erythema.
NEUROLOGICAL:  Normal gait, normal strength, normal tone.
SKIN:  Warm and dry.
 
ASSESSMENT AND PLAN:  EKG is with no changes.  Troponin is normal.  Last cardiac
intervention was in March, did not think he needs to have repeat coronary
angiography at this time.  He needs optimal medical management.  We will add a
calcium channel blocker in the form of Procardia-XL 90 to his blood pressure
medication regimen.  Hopefully, this will lower the blood pressure.
 
TRANSINT:[id number] Voice Confirmation ID: [id number] DOCUMENT ID: [id number]
 
 
 
 
 
HISTORY AND PHYSICAL                           [id number]    SUSAN HEAD JEFFREY MD             
 
 
 
 
 
 
 
 
 
 
 
 
 
 
 
 
 
 
 
 
 
 
 
 
 
 
 
 
 
 
 
 
 
 
 
 
 
 
 
 
 
 
 
 
 
CC:                                                             [id number]
DICTATION DATE: 19 1059     :     19 1138      DIS IN  
                                                                      19
[facility]                                          
[address] [place] [address]

## 2019-08-02 NOTE — MORECARE
CASE MANAGEMENT DISCHARGE SUMMARY
 
 
PATIENT: SUSAN LÓPEZ                    UNIT: [id number]
ACCOUNT#: [id number]                       ADM DATE: 19
AGE: 54     : 65  SEX: M            ROOM/BED: D.8697    
AUTHOR: BRENDA FRASER                             PHYSICIAN:                               
 
REFERRING PHYSICIAN: RAIMUNDO LANG MD               
DATE OF SERVICE: 19
Discharge Plan
 
 
Patient Name: SUSAN LÓPEZ
Facility: [facility]:[place]
Encounter #: [id number]
Medical Record #: [id number]
: 1965
Planned Disposition: Home
Anticipated Discharge Date: 19
 
Discharge Date: 
Expected LOS: 1
Initial Reviewer: [email or web address]
Initial Review Date: 2019
Generated: 19   4:46 pm 
Comments
 
DCP- Discharge Planning
 
Updated by [id number]: Suresh Rivera on 19   2:43 pm CT
Patient Name: SUSAN LÓPEZ                                     
Admission Status: ER   
Accout number: [id number]                              
Admission Date: 2019   
: 1965                                                        
Admission Diagnosis:DIZZINESS AND GIDDINESS   
Attending: RAIMUNDO LANG                                                
Current LOS:  1   
  
Anticipated DC Date: 2019   
Planned Disposition: Home   
Primary Insurance: MEDICAID [place]   
  
  
Discharge Planning Comments:   
CM MET WITH PT IN ROOM TO DISCUSS DISCHARGE PLANNING AND NEEDS. SUSAN LÓPEZ provided verbal consent to discuss current and ongoing needs with/in 
the presence of: SPOUSE, TU.  PT REPORTS LIVING AT HOME INDEPENDENTLY WITH 
HER HIS WIFE.  PT HAS NO MEDICAL EQUIPMENT AND NO OUTSIDE SERVICES ASSISTING 
 
IN THE HOME. CM DISCUSSED AVAILABILITY OF HOME HEALTH, REHAB SERVICES AND 
MEDICAL EQUIPMENT. PT DENIES DISCHARGE NEEDS, REPORTS HIS WIFE WILL PICK HIM 
UP FOR DISCHARGE HOME.   
  
: Suresh Rivera
 DCPIA - Discharge Planning Initial Assessment
 
Updated by [id number]: Suresh Rivera on 19   3:42 pm
*  Is the patient Alert and Oriented?
Yes
*  How many steps to enter\exit or inside your home?
 
*  PCP
DR. OLIVIA
*  Pharmacy
[facility] [address]
*  Preadmission Environment
Home with Family
*  ADLs
Independent
*  Equipment
None
*  Other Equipment
NO MEDICAL EQUIPMENT PROVIDER PREFERENCE
*  List name and contact numbers for known caregivers / representatives who 
currently or will assist patient after discharge:
TU LÓPEZ, SPOUSE, [phone]
*  Verbal permission to speak to the caregivers and representatives has been 
obtained from the patient.
Yes
*  Community resources currently utilized
None
*  Please name any agencies selected above.
NONE
*  Additional services required to return to the preadmission environment?
No
*  Can the patient safely return to the preadmission environment?
Yes
*  Has this patient been hospitalized within the prior 30 days at any 
hospital?
No
 
 
 
 
 
 
Patient Name: SUSAN LÓPEZ
 
Encounter #: [id number]
Page [id number]
 
 
 
 
 
Electronically Signed by BRENDA FRASER on 19 at 1547
 
 
 
 
 
 
**All edits/amendments must be made on the electronic document**
 
DICTATION DATE: 19 1546     : CONRADO  19 1546     
RPT#: [id number]                                DC DATE:        
                                               STATUS: ADM IN  
[facility]
 [place] [address]
***END OF REPORT***

## 2019-08-02 NOTE — NUR
REVIEWED DISCHARGE INSTRUCTIONS WITH PT STATES UNDERSTANDING COPY GIVEN DCD
SALINE LOCK TO RAC WITH IV CATHETER  INTACT SITE FREE OF REDNESS OR EDEMA PT
DISCHARGED HOME LEFT UNIT IN STABLE CONDITION WITH ALL PERSONAL BELONGINGS VIA
W/C

## 2019-08-02 NOTE — DS
PATIENT:SUSAN HEAD                  :65   MEDICAL RECORD: [id number]
 
                              DISCHARGE SUMMARY
                                                         
ADMISSION DATE:    19                       DISCHARGE DATE:     19
 
 
DISCHARGE DIAGNOSES:
1.  Uncontrolled hypertension.
2.  Angina.
3.  Coronary artery disease.
4.  Previous coronary bypass graft surgery.
5.  Previous multivessel percutaneous transluminal coronary angioplasty and
stent.
6.  Hypertension.
7.  Hyperlipidemia.
 
HOSPITAL COURSE:  Mr. Head presents hours after discharge with uncontrolled
hypertension and angina.  He has no ST-T changes on his EKG.  His troponins are
normal.  He had the addition of Procardia-XL 90 to his blood pressure regimen. 
Will follow up with [facility] on Monday to see if this addition to
his regimen will control his blood pressure and relieve his symptomatology.
 
TRANSINT:[id number] Voice Confirmation ID: [id number] DOCUMENT ID: [id number]
                                           
                                           SHYAM MARTINEZ MD             
 
 
 
 
 
 
 
 
 
 
 
 
 
 
 
 
 
 
 
 
 
 
 
 
CC:                                                             [id number]
DICTATION DATE: 19 1059     :     193      DIS IN  
                                                                      19
[facility]                                          
[address] [place] [address]

## 2019-08-03 VITALS — DIASTOLIC BLOOD PRESSURE: 108 MMHG | SYSTOLIC BLOOD PRESSURE: 156 MMHG

## 2019-08-03 VITALS — SYSTOLIC BLOOD PRESSURE: 145 MMHG | DIASTOLIC BLOOD PRESSURE: 85 MMHG

## 2019-08-03 VITALS — DIASTOLIC BLOOD PRESSURE: 103 MMHG | SYSTOLIC BLOOD PRESSURE: 187 MMHG

## 2019-08-03 LAB
ALBUMIN SERPL-MCNC: 3.6 G/DL (ref 3.4–5)
ALP SERPL-CCNC: 102 U/L (ref 46–116)
ALT SERPL-CCNC: 30 U/L (ref 10–68)
ANION GAP SERPL CALC-SCNC: 10.9 MMOL/L (ref 8–16)
BASOPHILS NFR BLD AUTO: 0.4 % (ref 0–2)
BILIRUB SERPL-MCNC: 0.81 MG/DL (ref 0.2–1.3)
BUN SERPL-MCNC: 20 MG/DL (ref 7–18)
CALCIUM SERPL-MCNC: 8.3 MG/DL (ref 8.5–10.1)
CHLORIDE SERPL-SCNC: 108 MMOL/L (ref 98–107)
CK MB SERPL-MCNC: 1.8 U/L (ref 0–3.6)
CK SERPL-CCNC: 113 UL (ref 21–232)
CO2 SERPL-SCNC: 27 MMOL/L (ref 21–32)
CREAT SERPL-MCNC: 1.8 MG/DL (ref 0.6–1.3)
EOSINOPHIL NFR BLD: 4.2 % (ref 0–7)
ERYTHROCYTE [DISTWIDTH] IN BLOOD BY AUTOMATED COUNT: 13.8 % (ref 11.5–14.5)
GLOBULIN SER-MCNC: 2.8 G/L
GLUCOSE SERPL-MCNC: 92 MG/DL (ref 74–106)
HCT VFR BLD CALC: 44.1 % (ref 42–54)
HGB BLD-MCNC: 15.6 G/DL (ref 13.5–17.5)
IMM GRANULOCYTES NFR BLD: 0.5 % (ref 0–5)
LYMPHOCYTES NFR BLD AUTO: 27.5 % (ref 15–50)
MCH RBC QN AUTO: 31.6 PG (ref 26–34)
MCHC RBC AUTO-ENTMCNC: 35.4 G/DL (ref 31–37)
MCV RBC: 89.5 FL (ref 80–100)
MONOCYTES NFR BLD: 10.5 % (ref 2–11)
NEUTROPHILS NFR BLD AUTO: 56.9 % (ref 40–80)
OSMOLALITY SERPL CALC.SUM OF ELEC: 285 MOSM/KG (ref 275–300)
PLATELET # BLD: 134 10X3/UL (ref 130–400)
PMV BLD AUTO: 9.4 FL (ref 7.4–10.4)
POTASSIUM SERPL-SCNC: 3.9 MMOL/L (ref 3.5–5.1)
PROT SERPL-MCNC: 6.4 G/DL (ref 6.4–8.2)
RBC # BLD AUTO: 4.93 10X6/UL (ref 4.2–6.1)
SODIUM SERPL-SCNC: 142 MMOL/L (ref 136–145)
TROPONIN I SERPL-MCNC: 0.02 NG/ML (ref 0–0.06)
WBC # BLD AUTO: 5.7 10X3/UL (ref 4.8–10.8)

## 2019-08-03 NOTE — NUR
ADMIT TO ROOM 2112 FROM ER. ALERT/ORIENTED. PT PREVIOUSLY HERE YESTERDAY FOR
CHEST PAIN, NOW BACK FOR ELEVATED BP AND CHEST PAIN EPISODE. WILL BE NPO UNTIL
SEEN BY CARDIOLOGIST IN AM. ADMISSION ASSESSMENT AND HISTORY COMPLETED.
TELEMETRY INITIATED. DENIES ACTIVE CHEST PAIN. BP IS STILL ELEVATED, BUT PT
WAS GIVEN SEVERAL THINGS IN ER. WILL MONITOR AND SEE IF BP COMES DOWN.

## 2019-08-03 NOTE — NUR
PT AWAKE AND ORIENTED, C/O DRIED BLOOD IN HIS NOSE. KALYANI SARGENT ASSISTED WITH
GIVING HIM CLEANING MATERIALS. NO COMPLAINTS/CONCERNS, QUESTIONS/COMMENTS, AT
THIS TIME. CL IN REACH, SRX2.

## 2019-08-05 NOTE — MORECARE
CASE MANAGEMENT DISCHARGE SUMMARY
 
 
PATIENT: SUSAN LÓPEZ                    UNIT: [id number]
ACCOUNT#: [id number]                       ADM DATE: 19
AGE: 54     : 65  SEX: M            ROOM/BED: D.2112    
AUTHOR: BRENDA FRASER                             PHYSICIAN:                               
 
REFERRING PHYSICIAN: SHYAM MARTINEZ MD             
DATE OF SERVICE: 19
Discharge Plan
 
 
Patient Name: SUSAN LÓPEZ
[facility]
Encounter #: [id number]
Medical Record #: [id number]
: 1965
Planned Disposition: Home
Anticipated Discharge Date: 8/3/19
 
Discharge Date: 2019
Expected LOS: 1
Initial Reviewer: [id number]
Initial Review Date: 2019
Generated: 19   9:20 am 
  
 
 
 
 
 
 
 
Patient Name: SUSAN LÓPEZ
 
Encounter #: [id number]
Page [id number]
 
 
 
 
 
Electronically Signed by BRENDA FRASER on 19 at 0821
 
 
 
 
 
 
**All edits/amendments must be made on the electronic document**
 
DICTATION DATE: 19     : CONRADO  19     
RPT#: [id number]                                DC DATE:19
                                               STATUS: DIS IN  
[facility]
[address] [place] [address]
***END OF REPORT***

## 2020-09-16 ENCOUNTER — HOSPITAL ENCOUNTER (OUTPATIENT)
Dept: HOSPITAL 84 - D.ER | Age: 55
Setting detail: OBSERVATION
LOS: 1 days | Discharge: HOME | End: 2020-09-17
Attending: FAMILY MEDICINE | Admitting: FAMILY MEDICINE
Payer: MEDICAID

## 2020-09-16 VITALS — DIASTOLIC BLOOD PRESSURE: 88 MMHG | SYSTOLIC BLOOD PRESSURE: 166 MMHG

## 2020-09-16 VITALS — HEIGHT: 71 IN | WEIGHT: 255.54 LBS | BODY MASS INDEX: 35.77 KG/M2

## 2020-09-16 VITALS — SYSTOLIC BLOOD PRESSURE: 147 MMHG | DIASTOLIC BLOOD PRESSURE: 83 MMHG

## 2020-09-16 DIAGNOSIS — R07.9: ICD-10-CM

## 2020-09-16 DIAGNOSIS — N17.9: Primary | ICD-10-CM

## 2020-09-16 DIAGNOSIS — K21.9: ICD-10-CM

## 2020-09-16 DIAGNOSIS — G47.00: ICD-10-CM

## 2020-09-16 DIAGNOSIS — Z95.1: ICD-10-CM

## 2020-09-16 DIAGNOSIS — I12.9: ICD-10-CM

## 2020-09-16 DIAGNOSIS — R06.02: ICD-10-CM

## 2020-09-16 DIAGNOSIS — E78.5: ICD-10-CM

## 2020-09-16 DIAGNOSIS — E03.9: ICD-10-CM

## 2020-09-16 DIAGNOSIS — N18.9: ICD-10-CM

## 2020-09-16 LAB
ALBUMIN SERPL-MCNC: 3.9 G/DL (ref 3.4–5)
ALP SERPL-CCNC: 132 U/L (ref 30–120)
ALT SERPL-CCNC: 33 U/L (ref 10–68)
ANION GAP SERPL CALC-SCNC: 11.9 MMOL/L (ref 8–16)
APTT BLD: 25.4 SECONDS (ref 22.8–39.4)
BASOPHILS NFR BLD AUTO: 0.5 % (ref 0–2)
BILIRUB SERPL-MCNC: 0.57 MG/DL (ref 0.2–1.3)
BUN SERPL-MCNC: 19 MG/DL (ref 7–18)
CALCIUM SERPL-MCNC: 8.3 MG/DL (ref 8.5–10.1)
CHLORIDE SERPL-SCNC: 107 MMOL/L (ref 98–107)
CK MB SERPL-MCNC: 2.8 U/L (ref 0–3.6)
CK SERPL-CCNC: 125 UL (ref 21–232)
CO2 SERPL-SCNC: 23.5 MMOL/L (ref 21–32)
CREAT SERPL-MCNC: 2.1 MG/DL (ref 0.6–1.3)
CRP SERPL-MCNC: 0.2 MG/DL (ref 0–0.9)
D DIMER PPP FEU-MCNC: 0.48 UG/MLFEU (ref 0.2–0.54)
EOSINOPHIL NFR BLD: 5.4 % (ref 0–7)
ERYTHROCYTE [DISTWIDTH] IN BLOOD BY AUTOMATED COUNT: 13.1 % (ref 11.5–14.5)
GLOBULIN SER-MCNC: 2.8 G/L
GLUCOSE SERPL-MCNC: 92 MG/DL (ref 74–106)
HCT VFR BLD CALC: 41.8 % (ref 42–54)
HGB BLD-MCNC: 14.4 G/DL (ref 13.5–17.5)
IMM GRANULOCYTES NFR BLD: 0.2 % (ref 0–5)
INR PPP: 1.16 (ref 0.85–1.17)
LIPASE SERPL-CCNC: 404 U/L (ref 73–393)
LYMPHOCYTES NFR BLD AUTO: 31.8 % (ref 15–50)
MAGNESIUM SERPL-MCNC: 1.9 MG/DL (ref 1.8–2.4)
MCH RBC QN AUTO: 31.7 PG (ref 26–34)
MCHC RBC AUTO-ENTMCNC: 34.4 G/DL (ref 31–37)
MCV RBC: 92.1 FL (ref 80–100)
MONOCYTES NFR BLD: 11.4 % (ref 2–11)
NEUTROPHILS NFR BLD AUTO: 50.7 % (ref 40–80)
NT-PROBNP SERPL-MCNC: 163 PG/ML (ref 0–125)
OSMOLALITY SERPL CALC.SUM OF ELEC: 279 MOSM/KG (ref 275–300)
PLATELET # BLD: 131 10X3/UL (ref 130–400)
PMV BLD AUTO: 10 FL (ref 7.4–10.4)
POTASSIUM SERPL-SCNC: 3.4 MMOL/L (ref 3.5–5.1)
PROT SERPL-MCNC: 6.7 G/DL (ref 6.4–8.2)
PROTHROMBIN TIME: 14.7 SECONDS (ref 11.6–15)
RBC # BLD AUTO: 4.54 10X6/UL (ref 4.2–6.1)
SODIUM SERPL-SCNC: 139 MMOL/L (ref 136–145)
TROPONIN I SERPL-MCNC: 0.02 NG/ML (ref 0–0.06)
TSH SERPL-ACNC: 3.61 UIU/ML (ref 0.36–3.74)
WBC # BLD AUTO: 4.3 10X3/UL (ref 4.8–10.8)

## 2020-09-16 NOTE — OP
PATIENT NAME:  SUSAN LÓPEZ                          MEDICAL RECORD: [id number]
:[birth year]                                             LOCATION:[facility]
                                                         ADMISSION DATE:20
SURGEON:  DEVAN GONZALEZ MD          
 
 
DATE OF OPERATION:  2020
 
PROCEDURE:  Left heart catheterization, selective coronary angiography, right
femoral artery approach.
 
CATHETERS:  A 5-German sheath, 5/4 left and right Jane, 5/4 pig.
 
The procedure was well tolerated.  The patient returned to the corea, sheath
removed.  ExoSeal device placed.
 
FINDINGS:  Left ventriculography in 30-degree JOHANSEN view:  Normal wall motion and
normal systolic function.
 
CORONARY ANATOMY:
LEFT MAIN:  Left main is free of disease.
LAD:  Fills for a short period of time and is totally occluded.
CIRCUMFLEX:  Fills for a short period of time, totally occluded.
RIGHT CORONARY ARTERY:  Totally occluded.
 
BYPASS GRAFTS:
1. LIMA to LAD is widely patent and fills the large diagonal in a retrograde
fashion nicely as well.
2. Circumflex:  The circumflex graft has a previously placed stent.  Graft
itself is widely patent with some ghost filling to the distal right.
 
IMPRESSION:  Probably unstable angina secondary to collateral insufficiency. 
Continue medical management.
 
TRANSINT:[id number] Voice Confirmation ID: [id number] DOCUMENT ID: [id number]
                                           
                                           DEVAN GONZALEZ MD          
 
 
 
 
 
 
 
 
 
 
 
 
 
CC:                                                             [id number]
DICTATION DATE: 20 1635     :     20 2344      DIS IN  
                                                                      20
[facility]                                          
[address] [place] [address]

## 2020-09-16 NOTE — NUR
PATIENT IN WITH MID CHEST PAIN AND SOB THAT STARTED THIS AFTERNOON, AT FIRST HE
STATES WAS INTERMITTENT AND BECAOME MORE CONSTANT. HAD A CARDIAC STENT PLACED
08/19 AND CABG 10 YEARS AGO. PATIENT IS A+O X3.

## 2020-09-16 NOTE — CN
PATIENT NAME:SUSAN LÓPEZ                              MEDICAL RECORD: [id number]
: 65                                              LOCATION:[facility]07-
ADMIT DATE: 20                                       ACCOUNT: [id number]
CONSULTING PHYSICIAN:    DEVAN GONZALEZ MD          
                                               
REFERRING PHYSICIAN:     VIRGIL BUTLER MD               
 
 
DATE OF CONSULTATION:  2020
 
HISTORY OF PRESENT ILLNESS:  A 55-year-old gentleman with a known history of
coronary artery disease, status post coronary artery bypass grafting, most
recently had intervention of the circumflex to the vein graft, seen with chest
pain, fairly rapid progression of symptomology with noticed some dyspnea on
exertion, chest tightness, pressure radiating to left arm and jaw.  We are asked
to see him concerning his cardiovascular status.
 
PAST MEDICAL HISTORY:  Otherwise includes;
1.  History of coronary artery disease as described above.
2.  Hypertension.
3.  Hyperlipidemia.
4.  Hypothyroidism, on replacement.
 
ALLERGIES:  None known.
 
MEDICATIONS:  Include Plavix 75 every day, atorvastatin 40 every day, Ranexa a
gram b.i.d., isosorbide 120 mg every day, Hytrin 2 mg p.o. every day, lisinopril
10 mg p.o. every day, aspirin 81 every day, Lexapro 10 every day, trazodone 50
at bedtime, aspirin 81 every day, Synthroid 137 mcg every day.
 
SOCIAL HISTORY:  Nonsmoker, nondrinker.  He is able to care of all his ADLs.
 
REVIEW OF SYSTEMS:  The patient reports easy bruising but reports no swollen
glands. The patient reports no fever, no night sweats, no significant weight
gain, no significant weight loss.  No significant exercise tolerance.  The
patient reports no dry eyes, no irritation, no vision change.  Patient reports
no difficulty hearing and no ear pain.  Patient reports no frequent nose bleeds
or nose and sinus problems.  Patient reports on arm pain on exertion.  No
shortness of breath while lying down.  No history of heart murmur.  Patient
reports no cough, no wheezing or coughing up blood.  Patient reports no
abdominal pain, no vomiting.  Normal appetite.  No diarrhea and not vomiting
blood.  No nausea and no constipation.  Patient reports no incontinence.  No
difficulty urinating.  No hematuria.  No increased frequency.  Patient reports
no muscle aches.  No weakness, no arthralgias, no back pain.  No swelling of the
extremities.  Patient reports no abnormal mole, no jaundice, no rashes.  Reports
no loss of consciousness.  No weakness and no numbness.  No seizures, dizziness,
or headaches.  The patient reports no depression, no sleep disturbance, feeling
safe in a relationship and no alcohol abuse.  Patient reports on fatigue. 
Reports no runny nose or sinus pressure.  No itching, no hives, and no frequent
sneezing.
 
PHYSICAL EXAMINATION:
GENERAL:  Pleasant, no acute distress, appears stated age.
VITAL SIGNS:  Blood pressure 137/84, pulse 58 and regular.
HEENT:  Normocephalic, atraumatic.
NECK:  No JVD or bruit.
HEART:  Regular.  A II/VI systolic ejection murmur.
LUNGS:  Good air excursion.
 
 
 
CONSULT REPORT                                 [id number]    SUSAN LÓPEZ            
 
 
ABDOMEN:  Soft, nontender.
EXTREMITIES:  Pulses 2+.  There is no edema.
 
DIAGNOSTIC DATA:  EKG shows nonspecific ST-T changes inferiorly.
 
IMPRESSION:  Acute coronary syndrome.
 
PLAN:  For angiography, intervention based on the above.
 
TRANSINT:[id number] Voice Confirmation ID: [id number] DOCUMENT ID: [id number]
                                           
                                           DEVAN GONZALEZ MD          
 
 
 
 
 
 
 
 
 
 
 
 
 
 
 
 
 
 
 
 
 
 
 
 
 
 
 
 
 
 
 
 
 
 
 
CC:                                                             [id number]
DICTATION DATE: 20 1237     :     20 1509      ADM IN  
                                                                              
[facility]                                          
[address] [place] [address]

## 2020-09-16 NOTE — HEMODYNAMI
PATIENT:SUSAN LÓPEZ                                 MEDICAL RECORD: [id number]
: 65                                            [facility]    [facility]# [id number]                                       ADMISSION DATE: 20
 
 
 Generatedon:202016:28
Patient name: SUSAN LÓPEZ Patient #: [id number] Visit #: [id number] SSN: [id number]
[id number] :
1965 Date of study: 2020
Page: Of
Hemodynamic Procedure Report
****************************
Patient Data
Patient Demographics
Procedure consent was obtained
First Name: SUSAN           Gender: Male
Last Name: RENE           : 1965
Middle Initial: SAMMIE         Age: 55 year(s)
Patient #: [id number]       Race: 
Visit #: [id number]
SSN: [id number]
Accession #:
[id number]
Additional ID: [id number]
Contact details
Address: [address] [place] [address]    Phone: [phone]
State: [place]
City: [place]
Zip code: [address]
Past Medical History
Allergies: No known allergies
Admission
Admission Data
Admission Date: 2020   Admission Time: 23:02
Arrival Date: 2020     Arrival Time: 23:02
Admit Source: Emergency     Insurance Payor: Medicaid
department                  [facility] #: [id number]
Room #: D.E07
Height (in.): 70.87         BSA: 2.33 (m2)
Height (cm.): 180           BMI: 35.49 (kg/m2)
Weight (lbs.): 253.53
Weight (kg.): 115
Lab Results
Lab Result Date: 2020  Lab Result Time: 0:00
Biochemistry
Name         Units    Result                Min      Max
BUN          mg/dl    18       --(---*)--   7        18
CK-MB        ng/ml    2.4      --(--*-)--   0        3.6
Creatinine   mg/dl    1.9      --(----)-*   0.6      1.3
eGFR         ml/min   39       *-(----)--   90       120
NONAFRICAN
Troponin l   ng/ml    0.017    --(-*--)--   0        0.06
CBC
Name         Units    Result                Min      Max
Hematocrit   %        41.1     -*(----)--   42       54
Hemoglobin   g/dl     14       --(*---)--   13.5     17.5
Procedure
Procedure Types
Cath Procedure
 
Diagnostic Procedure
LHC
LHC w/Coronaries w/Grafts
Sedation Charges
Moderate Sedation up to 30 minutes
Procedure Description
Procedure Date
Procedure Date: 2020
Procedure Start Time: 16:00
Procedure End Time: 16:26
Procedure Staff
Name                            Function
Douglas Henry MD              Performing Physician
Aspen Vickers RT                    Monitor
Debbie Razo RT                Scrub
Cooper Main RN                  Nurse
Procedure Data
Cath Procedure
Fluoroscopy
Diagnostic fluoroscopy      Total fluoroscopy Time: 3.9
time: 3.9 min               min
Diagnostic fluoroscopy      Total fluoroscopy dose:
dose: 1406 mGy              1406 mGy
Contrast Material
Contrast Material Type                       Amount (ml)
Isovue 300                                   81
Entry Location
Entry     Primary  Successful  Side  Size  Upsize Upsize Entry    Closure Succes
sful  Closure
Location                             (Fr)  1 (Fr) 2 (Fr) Remarks  Device        
      Remarks
Femoral                        Right 5 Fr                         Exoseal
artery
Estimated blood loss: 5 ml
Diagnostic catheters
Device Type               Used For           End Catheter
Placement
MULTIPACK JL 4.0 5Fr      Left Coronary
catheter                  Angiography
MULTIPACK 3DRC 5Fr        Right Coronary
catheter                  Angiography
MULTIPACK Pigtail 5 Fr    LV Angiography
catheter
Procedure Complications
No complications
Procedure Medications
Medication           Administration Route Dosage
Oxygen               etCO2 Nasal cannula  2 l/min
Lidocaine 2%         added to field       20
Heparin Flush Bag    added to field       2 bags
(1000units/500ml NS)
0.9% NaCl                                 100 ml/hr
Versed               I.V.                 2 mg
Fentanyl             I.V.                 100 mcg
Benadryl             I.V.                 50 mg
Versed               I.V.                 1 mg
Fentanyl             I.V.                 50 mcg
Versed               I.V.                 1 mg
Fentanyl             I.V.                 50 mcg
Hemodynamics
 
Rest
BSA: 2.33 (m2) HGB: 14 (g/dl) O2 Consumption: Estimated: 269.33 (ml/min) O2 Cons
umption
indexed: Estimated:115.59 (ml/min/m) Heart Rate: 62 (bpm)
Pressure Samples
Time     Site     Value (mmHg) Purpose      Heart      Use
Rate(bpm)
16:08    LV       113/19,18    Snapshot     66
Snapshots
Pre Cath      Intra         NCS           Post Cath
Vital Signs
Time     Heart  Resp   SPO2 etCO2   NIBP (mmHg)  Rhythm  Pain    Sedation
Rate   (ipm)  (%)  (mmHg)                       Status  Level
(bpm)
15:37:45 63     17     99   0       154/94(128)  NSR     0 (11)  10(A)
, No
pain
15:41:02 57     19     99   32.9    145/92(115)  NSR     0 (11)  10(A)
, No
pain
15:45:20 61     16     100  7.4     133/88(107)  NSR     0 (11)  10(A)
, No
pain
15:49:36 58     16     100  0       147/102(135) NSR     0 (11)  10(A)
, No
pain
15:53:52 58     14     99   0       148/95(113)  NSR     0 (11)  10(A)
, No
pain
15:58:08 61     15     100  41.9    147/97(128)  NSR     0 (11)  9(A)
, No
pain
16:02:32 64     15     98   23.1    138/90(115)  NSR     0 (11)  9(A)
, No
pain
16:06:48 65     13     98   27.6    134/90(111)  NSR     0 (11)  9(A)
, No
pain
16:11:06 64     16     98   28.4    132/89(111)  NSR     0 (11)  10(A)
, No
pain
16:15:53 63     15     98   0       136/91(106)  NSR     0 (11)  9(A)
, No
pain
Medications
Time     Medication       Route   Dose  Verified Delivered Reason     Notes  Eff
ectiveness
by       by
15:40:30 Benadryl         I.V.    50 mg Douglas Peñalozaie    used for
Elaine  Main RN   procedure
MD
15:42:41 Oxygen           etCO2   2     Douglas  Buffie    used for
Nasal   l/min [facility] Larry Main RN   procedure
cannula       MD
15:42:48 Lidocaine 2%     added   20ml  Douglas  Buffie    for local
to      vial  ElaineLarry Main RN   anesthetic
field         MD
15:42:54 Heparin Flush    added   2     Douglas  Buffie    used for
Bag              to      bags  [facility] Larry Main RN   procedure
(1000units/500ml field         MD
 
NS)
15:43:38 0.9% NaCl                100   Douglas Peñalozaie    Per
ml/hr St Larry Main RN   physician
MD
15:43:58 Versed           I.V.    2 mg  Douglas Peñalozaie    for
Elaine  Bell RN   sedation
MD
15:44:03 Fentanyl         I.V.    100   Douglas  Buffie    for
mcg   [facility] Larry Main RN   sedation
MD
15:50:38 Versed           I.V.    1 mg  Douglas Gamboa    for
Elaine  Bell RN   sedation
MD
15:50:42 Fentanyl         I.V.    50    Douglas Gamboa    for
kiera Recinos RN   sedation
MD
15:57:25 Versed           I.V.    1 mg  Douglas Gamboa    for
Elaine  Bell RN   sedation
MD
15:57:28 Fentanyl         I.V.    50    Douglas Gamboa    for
kiera Recinos RN   sedation
MD
Procedure Log
Time     Note
15::08 Informed consent obtained and on chart
15:07:24 Diagnostic Cath Status : Emergency
15:08:08 Admit Source: Emergency department
15:08:11 **ACC** Patient presents with Stable Angina CCS
Anginal Class 2--Slight limitation of ordinary
activity.
15:08:14 Procedure Status Emergent Heart Cath ([facility]).
15:08:17 Time tracking: Regular hours (M-F 7:00 - 5:00)
15:08:22 Plan of Care:Hemodynamics will remain stable., Cardiac
rhythm will remain stable., Comfort level will be
maintained., Respiratory function will remain
adequate., Patient/ family verbilizes understanding of
procedure., Procedure tolerated without complication.,
Recovers from procedure without complications..
15:09:11 Lab Result : Creatinine 1.9 mg/dl
15:09:11 Lab Result : BUN 18 mg/dl
15:09:11 Lab Result : Troponin l 0.017 ng/ml
15:09:11 Lab Result : CK-MB 2.4 ng/ml
15:09:11 Lab Result : eGFR NONAFRICAN 39 ml/min
15:09:11 Lab Result : Hemoglobin 14 g/dl
15:09:11 Lab Result : Hematocrit 41.1 %
15::18 Lab results completed and on chart.
15:10:38 Alarms reviewed by R. N.
15:10:39 Sharps counted by scrub and verified by R.N.
15:16:54 Cooper Main RN sent for patient. Start room use.
15:21:06 Correct patient and procedure confirmed by team.
15:23:40 Arrival Date: 2020 11:02:00 PM
15:24:01 Insurance Payor : Medicaid
15:24:36 Patient Height : 70.87 inches
15:24:39 Patient Weight : 253.53 lbs
15:33:00 Patient received from ED to CCL 1 Alert and oriented.
Tansferred to table in Supine position.
15:33:01 Warm blankets applied, and kacey hugger turned on for
patient comfort.
15:33:01 ECG and BP/O2 sat monitors applied to patient.
15:33:02 Vital chart was started
 
15:39:59 Baseline sample Acquired.
15:40:02 Rhythm: sinus rhythm
15:40:04 Full Disclosure recording started
15:40:08 H&P Date Dictated: 2020 New H&P dictated by
physician..
15:40:10 Pre-procedure instructions explained to patient.
15:40:10 Pre-op teaching completed and patient verbalized
understanding.
15:40:14 Family unavailable.
15:40:15 Patient NPO since Midnight.
15:40:19 Is the patient allergic to Iodine/contrast media? No.
15:40:20 Was the patient premedicated? Yes
15:40:21 Is patient on blood thinner?Yes
15:40:23 **ACC** The patient was administered the following
blood thiners within the last 24 hours: **ACC**Plavix
15:40:25 Patient diabetic? No.
15:40:29 Previous problem with sedation/anesthesia? No ?
15:40:30 Benadryl 50 mg I.V. was administered by Cooper Main
RN; used for procedure; Verbal order read back and
verified.
15:40:30 Snore? Yes
15:40:31 Sleep apnea? Yes
15:40:32 Deviated septum? No
15:40:33 Opens mouth fully? Yes
15:40:34 Sticks out tongue? Yes
15:40:45 Airway obstruction? No ?
15:40:48 Dentures? No ?
15:40:51 Pre procedure: right dorsailis pedis pulse 2+ Normal;
easily identifiable; not easily obliterated
15:40:53 Pre procedure: left dorsailis pedis pulse 2+ Normal;
easily identifiable; not easily obliterated
15:41:01 IV patent on arrival in right forearm with 0.9% NaCl
at [facility].
15:41:06 Right groin area was prepped with chlora-prep and
draped in sterile fashion
15:42:41 Oxygen 2 l/min etCO2 Nasal cannula was administered by
Cooper Main RN; used for procedure; Verbal order read
back and verified.
15:42:48 Lidocaine 2% 20ml vial added to field was administered
by Cooper Main RN; for local anesthetic; Verbal order
read back and verified.
15:42:54 Heparin Flush Bag (1000units/500ml NS) 2 bags added to
field was administered by Cooper Main RN; used for
procedure; Verbal order read back and verified.
15:42:59 Physician arrived
15:43:00 --------ALL STOP TIME OUT------
15:43:00 Final Timeout: patient, procedure, and site verified
with staff and physician. All members of the team are
in agreement.
15:43:02 Right groin site verified by team.
15:43:05 Fire Safety Assessment: A--An alcohol-based skin
anteseptic being used preoperatively., C--Open oxygen
or nitrous oxide is being used., D--An ESU, laser, or
fiber-optic light is being used.
15:43:09 Physical assessment completed. ASA score P 2 - A
patient with mild systemic disease as per Douglas Henry MD.
15:43:12 3b) 30-44 Moderately reduced kidney function.
15:43:15 Maximum allowable contrast dose (3.7 X eGFR X 0.75)97
ml.
 
15:43:20 Sedation plan: IV Moderate Sedation Medication:Versed,
Fentanyl
15:43:38 0.9% NaCl 100 ml/hr was administered by Cooper Main
RN; Per physician; Verbal order read back and
verified.
15:43:58 Versed 2 mg I.V. was administered by Cooper Main RN;
for sedation; Verbal order read back and verified.
15:44:03 Fentanyl 100 mcg I.V. was administered by Cooper Main
RN; for sedation; Verbal order read back and verified.
15:50:38 Versed 1 mg I.V. was administered by Cooper Main RN;
for sedation; Verbal order read back and verified.
15:50:42 Fentanyl 50 mcg I.V. was administered by Cooper Main
RN; for sedation; Verbal order read back and verified.
15:55:29 Baseline sample Acquired.
15:55:35 Use device set Femoral Dx
15:55:36 ACIST Syringe (09484) opened to sterile field.
15:55:37 Bag Decanter () opened to sterile field.
15:55:37 Medline Cath Pack ([id number]) opened to sterile field.
15:55:38 ACIST Hand Control (67839) opened to sterile field.
15:55:39 ACIST Manifold (70853) opened to sterile field.
15:55:39 DIAGNOSTIC Multipack 5Fr catheter set (HY3911) opened
to sterile field.
15:55:40 Tegaderm 4 x 4 (1626W) opened to sterile field.
15:55:41 SHEATH 5FR Lugoff (OOB135) opened to sterile field.
15:55:41 EMERALD Guide Wire (041-432) opened to sterile field.
15:57:25 Versed 1 mg I.V. was administered by Cooper Main RN;
for sedation; Verbal order read back and verified.
15:57:28 Fentanyl 50 mcg I.V. was administered by Cooper Main
RN; for sedation; Verbal order read back and verified.
16:00:15 Procedure started.
16:00:18 Local anesthetic to right femoral artery with
Lidocaine 2% by Douglas Henry MD.**INITIAL ACCESS
ONLY**
16:00:28 A 5 Fr sheath was inserted into the Right Femoral
artery
16:00:33 A MULTIPACK JL 4.0 5Fr catheter was advanced over the
wire and used for Left Coronary Angiography.
16:00:42 LCA angiography performed.
16:00:44 Injector settings: Ml/sec: 3, Volume: 6,
16:01:12 Catheter removed.
16:01:16 A MULTIPACK 3DRC 5Fr catheter was advanced over the
wire and used for Right Coronary Angiography.
16:01:53 RCA angiography performed.
16:02:05 Injector settings: Ml/sec: 3, Volume: 6,
16:02:12 SVG to Circ angiography performed.
16:05:36 GLIDE WIRE ANGLE 260cm ([id number]) opened to sterile
field.
16:06:19 glide wire used to help gain access to lima
16:07:06 LIMA to LAD angiography performed.
16:07:14 Injector settings: Ml/sec: 3, Volume: 6,
16:07:20 Catheter removed.
16:07:26 A MULTIPACK Pigtail 5 Fr catheter was advanced over
the wire and used for LV Angiography.
16:08:44 LV hemodynamics recorded.
16:08:45 LV gram done using JOHANSEN
16:08:47 Injector settings: Ml/sec: 5, Volume: 15,
16:09:19 Aortic Root visualized
16:10:43 Injector settings: Ml/sec: 10, Volume: 20,
16:10:46 Catheter removed.
16:10:55 EXOSEAL 5Fr () opened to sterile field.
 
16:11:13 Sheath removed intact; hemostasis achieved with
Exoseal to the Right Femoral artery.
16:11:14 Procedure ended.(Physican Out)
16:11:56 Fluoroscopy time 03.90 minutes.
16:12:00 Flurop Dose total: 1406
16:12:00 Fluoroscopy dose: 1406 mGy
16:12:05 Dose Area Product 82255 mGy/cm.
16:12:09 Contrast amount:Isovue 300 81ml.
16:12:51 Maximum allowable dose exceeded? No.
16:14:16 Sharps counted by scrub and verified by R.N.
16:14:18 Insertion/operative site no bleeding no hematoma.
16:14:30 Post-op/insertion site Right Femoral artery dressed
using a 4 x 4 and Tegaderm.
16:14:31 Post Procedure Pulses reassessed and unchanged
16:15:08 Post procedure rhythm: unchanged.
16:15:10 Estimated blood loss: 5 ml
16:15:12 Post procedure instruction explained to
patient.Patient verbalizes understanding.
16:15:12 Patient needs reinforcement of post procedure
teaching.
16:15:19 Procedure type changed to Cath procedure, Diagnostic
procedure, LHC, C w/Coronaries w/Grafts, Sedation
Charges, Moderate Sedation up to 30 minutes
16:16:01 Procedure and supply charges have been captured,
reviewed, submitted and are correct.
16:16:05 Procedure Complication : No complications
16:16:08 Vital chart was stopped
16:16:21 [facility] Findings: mild to moderate CAD (<70%)
16:16:22 Operative report dictated upon procedure completion.
16:16:29 See physician's report for complete and final results.
16:26:08 Report given to Pre/Post Procedure Room.
16:26:11 Patient transfered to Pre/Post Procedure Room with
Stretcher.
16:26:13 Procedure ended.
16:26:13 Full Disclosure recording stopped
16:26:17 End room use (Document Last)
16:26:45 End room use (Document Last)
16:27:01 End room use (Document Last)
Device Usage
Item Name   Manufacture  Quantity  Catalog    Hospital Part    Current Minimal L
ot# /
Number     Charge   Number  Stock   Stock   Serial#
Code
ACIST       Acist        1         45465      620294   286200  137605  20
Syringe     Medical
(78576)     Systems Inc
Bag         Microtek     1         S      564015   76391   742716  5
Decanter    Medical Inc.
(S)
Medline     Medline      1         [id number]  474989   35765   752349  5
Cath Pack
([id number])
ACIST Hand  Acist        1         05230      951199   587792  962345  5
Control     Medical
(17167)     Systems Inc
ACIST       Acist        1         91740      183711   714672  503641  5
Manifold    Medical
(22534)     Systems Inc
DIAGNOSTIC  Cardinal     1         [id number]     295681   30447   850724  30
Multipack   Health
 
5Fr
catheter
set
(PO1728)
Tegaderm 4  3M           1         1626W      299145   060656  651527  5
x 4 (1626W)
SHEATH 5FR  Terumo       1         WDY274     107296   781440  005148  5
Lugoff
(TSL077)
EMERALD     Cardinal     1         502-455    586268   142748  635405  5
Guide Wire  Health
(502-455)
MULTIPACK   Cardinal     1                                     849107  5
JL 4.0 5Fr  Health
catheter
MULTIPACK   Cardinal     1                                     276901  5
3DRC 5Fr    Health
catheter
GLIDE WIRE  Terumo       1         VS9640     043942   940174  234545  5
ANGLE 260cm
(NV7885)
MULTIPACK   Cardinal     1                                     044585  5
Pigtail 5   Health
Fr catheter
EXOSEAL 5Fr Cardinal     1               842407   902798  464591  10
()     Health
Signature Audit [place]
Stage           Time        Signature      Unsigned
Intra-Procedure 2020   Aspen Vickers
4:26:45 PM  RT(R)
Intra-Procedure 2020   Cooper Main RN
4:27:01 PM
Intra-Procedure 2020   Douglas Chowdary
4:28:53 PM  Larry FISHER
 
 
 
 
 
 
 
 
 
 
 
 
 
 
 
 
 
 
 
 
 
[address] [place] [address]

## 2020-09-17 VITALS — SYSTOLIC BLOOD PRESSURE: 147 MMHG | DIASTOLIC BLOOD PRESSURE: 92 MMHG

## 2020-09-17 VITALS — SYSTOLIC BLOOD PRESSURE: 151 MMHG | DIASTOLIC BLOOD PRESSURE: 90 MMHG

## 2020-09-17 VITALS — SYSTOLIC BLOOD PRESSURE: 134 MMHG | DIASTOLIC BLOOD PRESSURE: 66 MMHG

## 2020-09-17 VITALS — DIASTOLIC BLOOD PRESSURE: 84 MMHG | SYSTOLIC BLOOD PRESSURE: 137 MMHG

## 2020-09-17 VITALS — SYSTOLIC BLOOD PRESSURE: 152 MMHG | DIASTOLIC BLOOD PRESSURE: 94 MMHG

## 2020-09-17 VITALS — DIASTOLIC BLOOD PRESSURE: 93 MMHG | SYSTOLIC BLOOD PRESSURE: 161 MMHG

## 2020-09-17 VITALS — DIASTOLIC BLOOD PRESSURE: 92 MMHG | SYSTOLIC BLOOD PRESSURE: 142 MMHG

## 2020-09-17 VITALS — SYSTOLIC BLOOD PRESSURE: 145 MMHG | DIASTOLIC BLOOD PRESSURE: 91 MMHG

## 2020-09-17 LAB
ALT SERPL-CCNC: 30 U/L (ref 10–68)
ANION GAP SERPL CALC-SCNC: 11.5 MMOL/L (ref 8–16)
BASOPHILS NFR BLD AUTO: 0.5 % (ref 0–2)
BILIRUB SERPL-MCNC: NEGATIVE MG/DL
BUN SERPL-MCNC: 18 MG/DL (ref 7–18)
CALCIUM SERPL-MCNC: 7.9 MG/DL (ref 8.5–10.1)
CHLORIDE SERPL-SCNC: 109 MMOL/L (ref 98–107)
CHOLEST/HDLC SERPL: 4 RATIO (ref 2.3–4.9)
CK MB SERPL-MCNC: 2.3 U/L (ref 0–3.6)
CK MB SERPL-MCNC: 2.4 U/L (ref 0–3.6)
CK SERPL-CCNC: 100 UL (ref 21–232)
CK SERPL-CCNC: 101 UL (ref 21–232)
CO2 SERPL-SCNC: 25.4 MMOL/L (ref 21–32)
CREAT SERPL-MCNC: 1.9 MG/DL (ref 0.6–1.3)
EOSINOPHIL NFR BLD: 5.2 % (ref 0–7)
ERYTHROCYTE [DISTWIDTH] IN BLOOD BY AUTOMATED COUNT: 13.1 % (ref 11.5–14.5)
GLUCOSE SERPL-MCNC: 103 MG/DL (ref 74–106)
HCT VFR BLD CALC: 41.1 % (ref 42–54)
HDLC SERPL-MCNC: 35 MG/DL (ref 32–96)
HGB BLD-MCNC: 14 G/DL (ref 13.5–17.5)
IMM GRANULOCYTES NFR BLD: 0.2 % (ref 0–5)
KETONES UR STRIP-MCNC: NEGATIVE MG/DL
LDL-HDL RATIO: 2.5 RATIO (ref 1.5–3.5)
LDLC SERPL-MCNC: 88 MG/DL (ref 0–100)
LYMPHOCYTES NFR BLD AUTO: 28.8 % (ref 15–50)
MAGNESIUM SERPL-MCNC: 1.9 MG/DL (ref 1.8–2.4)
MCH RBC QN AUTO: 31.1 PG (ref 26–34)
MCHC RBC AUTO-ENTMCNC: 34.1 G/DL (ref 31–37)
MCV RBC: 91.3 FL (ref 80–100)
MONOCYTES NFR BLD: 9.7 % (ref 2–11)
NEUTROPHILS NFR BLD AUTO: 55.6 % (ref 40–80)
NITRITE UR-MCNC: NEGATIVE MG/ML
NT-PROBNP SERPL-MCNC: 188 PG/ML (ref 0–125)
OSMOLALITY SERPL CALC.SUM OF ELEC: 284 MOSM/KG (ref 275–300)
PH UR STRIP: 6 [PH] (ref 5–8)
PHOSPHATE SERPL-MCNC: 3.8 MG/DL (ref 2.5–4.9)
PLATELET # BLD: 108 10X3/UL (ref 130–400)
PMV BLD AUTO: 9.4 FL (ref 7.4–10.4)
POTASSIUM SERPL-SCNC: 3.9 MMOL/L (ref 3.5–5.1)
RBC # BLD AUTO: 4.5 10X6/UL (ref 4.2–6.1)
SODIUM SERPL-SCNC: 142 MMOL/L (ref 136–145)
TRIGL SERPL-MCNC: 93 MG/DL (ref 30–200)
TROPONIN I SERPL-MCNC: 0.02 NG/ML (ref 0–0.06)
TROPONIN I SERPL-MCNC: < 0.017 NG/ML (ref 0–0.06)
UROBILINOGEN UR-MCNC: NORMAL MG/DL (ref ?–2)
WBC # BLD AUTO: 4 10X3/UL (ref 4.8–10.8)

## 2020-09-17 NOTE — NUR
PT RESTING ON LEFT SIDE LYING SIDE. BLANKET GIVEN TO PT PER HIS REQUEST. CALL
LIGHT WITHIN REACH. DENIES CURRENT NEEDS. WILL CONTINUE TO MONITOR.

## 2020-09-17 NOTE — NUR
PATIENT RESTING QUIETLY AT THIS TIME, CALL LIGHT WITHIN REACH, DENIES PAIN AT
THIS TIME. COVID SPECIMEN OBTAINED AND SENT TO THE LAB.

## 2020-09-17 NOTE — NUR
R GROIN SITE SOFT, NO S/S BLEEDING OR HEMATOMA. PULSES PALP. VSS. PT
AWAKENS EASILY, DENIES PAIN OR NEEDS. C/L IN REACH.

## 2020-09-17 NOTE — NUR
PT REC'D TO ROOM 3 VIA STRETCHER FROM CATH LAB. MONITORS ESTAB.
DAUGHTER AT BS. SEE RN ASSESSMENT, ALARMS ON AND C/L IN REACH.

## 2020-09-17 NOTE — NUR
R GROIN SITE SOFT, NO S/S BLEEDING OR HEMATOMA.  PULSES PALP. VSS. PT
DENIES PAIN OR NEEDS. ALARMS ON AND C/L IN REACH.

## 2020-09-17 NOTE — NUR
R GROIN SITE SOFT, C/D/I.  PT ATE ALL OF SANDWICH. VSS.  PIV D/C'D
INTACT, DSG APPLIED.  PT ALLOWED UP TO GET DRESSED AND GO TO BR
INDEPENDENTLY.

## 2020-09-17 NOTE — NUR
R GROIN SOFT, NO S/S BLEEDING OR HEMATOMA. HOB ELEVATED, SANDWICH AND
TEA PROVIDED.  DAUGHTER AT BS.  VSS. C/L IN REACH.